# Patient Record
Sex: FEMALE | Race: BLACK OR AFRICAN AMERICAN | HISPANIC OR LATINO | Employment: OTHER | URBAN - METROPOLITAN AREA
[De-identification: names, ages, dates, MRNs, and addresses within clinical notes are randomized per-mention and may not be internally consistent; named-entity substitution may affect disease eponyms.]

---

## 2020-11-11 NOTE — PROGRESS NOTES
Subjective:       Patient ID: Debbie Earl is a 84 y.o. female.    Chief Complaint: Knee Pain    HPI     This is a 84 year old Chinese-speaking female with a history of HTN, left popliteal cyst, pre-diabetes, HLD, and CKD.  The patient's primary care physician found some abnormal labs at [facility] at [facility] and referred her to Rheumatology. Last year, the patient had swelling in her left leg. Venous duplex was done which was negative. MRI of the left leg was also done showing inguinal lymphadenopathy. She had a lymph node biopsy in December 2019 which was consistent with a benign reactive appearing lymph node. She has had swelling of the left leg from the knee down and discoloration of the left knee since then. She complains of pain in her left knee that improves with movement, and stiffness lasting 10-15 minutes. Venous duplex was negative. She has had some hair loss over the top of her head. Her inguinal lymphadenopathy is stable. She denies fevers, oral/nasal ulcers, ear infections/sinusitis, chest pain, shortness of breath, cough, abdominal pain, diarrhea, bloody stool, urinary changes, Raynaud's, and weakness.    Past Medical History:   HTN  CKD  Pre-DM  HLD  Glaucoma  Right arm fracture    Past Surgical History:   Lymph node biopsy  Hysterectomy  Fracture surgery    Gyn History:  2 pregnancies  1: pre term due to a cervical cyst  No miscarriages    Family History:   HTN- Mother  DM- Sister  No autoimmune diseases    Social History:   Retired housewife  Non-smoker  No alcohol or drug use    Allergies:   Penicillin- itching, shortness of breath, swelling    Review of Systems   Constitutional: Negative for fever and unexpected weight change.   HENT: Negative for mouth sores and trouble swallowing.    Eyes: Negative for redness.   Respiratory: Negative for cough and shortness of breath.    Cardiovascular: Negative for chest pain.   Gastrointestinal: Negative for constipation and diarrhea.   Genitourinary: Negative  "for dysuria and genital sores.   Skin: Positive for rash.   Neurological: Negative for headaches.   Hematological: Does not bruise/bleed easily.         Objective:   BP (!) 204/77 (BP Location: Right arm, Patient Position: Sitting, BP Method: Medium (Automatic))   Pulse 64   Temp 98.5 °F (36.9 °C) (Oral)   Ht 5' 3" (1.6 m)   Wt 72.5 kg (159 lb 13.3 oz)   BMI 28.31 kg/m²      Physical Exam   Constitutional: She is well-developed, well-nourished, and in no distress. No distress.   HENT:   Head: Normocephalic and atraumatic.   Mouth/Throat: No oropharyngeal exudate.   Eyes: Conjunctivae are normal. Right eye exhibits no discharge. Left eye exhibits no discharge. No scleral icterus.   Neck: Normal range of motion. Neck supple.   Cardiovascular: Normal rate, regular rhythm and normal heart sounds.    Pulmonary/Chest: Effort normal and breath sounds normal.   Abdominal: Soft. Bowel sounds are normal.   Skin: Skin is warm and dry. No rash noted. She is not diaphoretic.     Musculoskeletal:      Comments: Right knee swelling and hyperpigmentation          No data to display      CBC (8/5/20): WNL   CMP: GFR 51  TSH, t3, t4 WNL     Assessment:       1. Chronic pain of left knee    2. Rash    3. Menopause        This is an 84 year old Senegalese-speaking female with a history of HTN, left popliteal cyst, pre-diabetes, HLD, and CKD presenting with alopecia, left knee swelling and discoloration, left knee pain, and inguinal lymphadenopathy (benign reactive lymph node and biopsy).  Will check ESR, CRP, RF, CCP, ACE, lysozyme, MIGUE, SSA, anti histone, dsDNA, C3, C4 total complement, UA, UPC, left knee Xray, and chest Xray. She will need to be referred to Dermatology at [facility] [place].     In terms of her HTN, BP is markedly elevated today, but patient denies chest pain, shortness of breath, headaches or vision changes. Daughter states BP in more elevated when she is seen at clinic. This will need to be addressed with her PCP.     Plan:     "   Problem List Items Addressed This Visit     None      Visit Diagnoses     Chronic pain of left knee    -  Primary    Relevant Orders    DXA Bone Density Spine And Hip    X-Ray Knee 1 or 2 View Left    X-Ray Chest PA And Lateral    Sedimentation rate    C-Reactive Protein    Angiotensin Converting Enzyme    Lysozyme (Muramidase), Serum    MIGUE Screen w/Reflex    Sjogrens syndrome-A extractable nuclear antibody    ANTI-HISTONE ANTIBODY    Anti-DNA Ab, Double-Stranded    C3 Complement    C4 Complement    Complement, total    Urinalysis    Protein/Creatinine Ratio, Urine    Ambulatory referral/consult to Dermatology    RHEUMATOID FACTOR    Cyclic Citrullinated Peptide Antibody, IgG    Rash        Relevant Orders    DXA Bone Density Spine And Hip    X-Ray Knee 1 or 2 View Left    X-Ray Chest PA And Lateral    Sedimentation rate    C-Reactive Protein    Angiotensin Converting Enzyme    Lysozyme (Muramidase), Serum    MIGUE Screen w/Reflex    Sjogrens syndrome-A extractable nuclear antibody    ANTI-HISTONE ANTIBODY    Anti-DNA Ab, Double-Stranded    C3 Complement    C4 Complement    Complement, total    Urinalysis    Protein/Creatinine Ratio, Urine    Ambulatory referral/consult to Dermatology    RHEUMATOID FACTOR    Cyclic Citrullinated Peptide Antibody, IgG    Menopause        Relevant Orders    DXA Bone Density Spine And Hip        - ESR, CRP, RF, CCP, ACE, lysozyme, MIGUE, SSA, anti histone, dsDNA, C3, C4 total complement, UA, UPC  - Left knee Xray, chest Xray  - Patient to follow up with Dermatology     Patient seen and discussed with Dr. Cantrell    RTC in 2 months     Kristin Mark M.D.  Rheumatology, PGY 5

## 2020-11-12 ENCOUNTER — HOSPITAL ENCOUNTER (OUTPATIENT)
Dept: RADIOLOGY | Facility: HOSPITAL | Age: 85
Discharge: HOME | End: 2020-11-12
Attending: STUDENT IN AN ORGANIZED HEALTH CARE EDUCATION/TRAINING PROGRAM
Payer: MEDICARE

## 2020-11-12 ENCOUNTER — PATIENT MESSAGE (OUTPATIENT)
Dept: RHEUMATOLOGY | Facility: CLINIC | Age: 85
End: 2020-11-12

## 2020-11-12 ENCOUNTER — OFFICE VISIT (OUTPATIENT)
Dept: RHEUMATOLOGY | Facility: CLINIC | Age: 85
End: 2020-11-12
Payer: MEDICARE

## 2020-11-12 VITALS
TEMPERATURE: 99 F | WEIGHT: 159.81 LBS | BODY MASS INDEX: 28.32 KG/M2 | HEART RATE: 64 BPM | DIASTOLIC BLOOD PRESSURE: 77 MMHG | SYSTOLIC BLOOD PRESSURE: 204 MMHG | HEIGHT: 63 IN

## 2020-11-12 DIAGNOSIS — R21 RASH: ICD-10-CM

## 2020-11-12 DIAGNOSIS — G89.29 CHRONIC PAIN OF LEFT KNEE: ICD-10-CM

## 2020-11-12 DIAGNOSIS — M25.562 CHRONIC PAIN OF LEFT KNEE: ICD-10-CM

## 2020-11-12 DIAGNOSIS — Z78.0 MENOPAUSE: ICD-10-CM

## 2020-11-12 DIAGNOSIS — G89.29 CHRONIC PAIN OF LEFT KNEE: Primary | ICD-10-CM

## 2020-11-12 DIAGNOSIS — M25.562 CHRONIC PAIN OF LEFT KNEE: Primary | ICD-10-CM

## 2020-11-12 PROCEDURE — 99999 PR PBB SHADOW E&M-NEW PATIENT-LVL V: ICD-10-PCS | Mod: PBBFAC,GC,, | Performed by: STUDENT IN AN ORGANIZED HEALTH CARE EDUCATION/TRAINING PROGRAM

## 2020-11-12 PROCEDURE — 99999 PR PBB SHADOW E&M-NEW PATIENT-LVL V: CPT | Mod: PBBFAC,GC,, | Performed by: STUDENT IN AN ORGANIZED HEALTH CARE EDUCATION/TRAINING PROGRAM

## 2020-11-12 PROCEDURE — 1159F PR MEDICATION LIST DOCUMENTED IN MEDICAL RECORD: ICD-10-PCS | Mod: GC,S$GLB,, | Performed by: STUDENT IN AN ORGANIZED HEALTH CARE EDUCATION/TRAINING PROGRAM

## 2020-11-12 PROCEDURE — 99204 OFFICE O/P NEW MOD 45 MIN: CPT | Mod: GC,S$GLB,, | Performed by: STUDENT IN AN ORGANIZED HEALTH CARE EDUCATION/TRAINING PROGRAM

## 2020-11-12 PROCEDURE — 71046 XR CHEST PA AND LATERAL: ICD-10-PCS | Mod: 26,,, | Performed by: RADIOLOGY

## 2020-11-12 PROCEDURE — 3078F DIAST BP <80 MM HG: CPT | Mod: CPTII,GC,S$GLB, | Performed by: STUDENT IN AN ORGANIZED HEALTH CARE EDUCATION/TRAINING PROGRAM

## 2020-11-12 PROCEDURE — 3077F PR MOST RECENT SYSTOLIC BLOOD PRESSURE >= 140 MM HG: ICD-10-PCS | Mod: CPTII,GC,S$GLB, | Performed by: STUDENT IN AN ORGANIZED HEALTH CARE EDUCATION/TRAINING PROGRAM

## 2020-11-12 PROCEDURE — 73560 X-RAY EXAM OF KNEE 1 OR 2: CPT | Mod: 26,LT,, | Performed by: RADIOLOGY

## 2020-11-12 PROCEDURE — 73560 XR KNEE 1 OR 2 VIEW LEFT: ICD-10-PCS | Mod: 26,LT,, | Performed by: RADIOLOGY

## 2020-11-12 PROCEDURE — 1159F MED LIST DOCD IN RCRD: CPT | Mod: GC,S$GLB,, | Performed by: STUDENT IN AN ORGANIZED HEALTH CARE EDUCATION/TRAINING PROGRAM

## 2020-11-12 PROCEDURE — 71046 X-RAY EXAM CHEST 2 VIEWS: CPT | Mod: TC

## 2020-11-12 PROCEDURE — 99204 PR OFFICE/OUTPT VISIT, NEW, LEVL IV, 45-59 MIN: ICD-10-PCS | Mod: GC,S$GLB,, | Performed by: STUDENT IN AN ORGANIZED HEALTH CARE EDUCATION/TRAINING PROGRAM

## 2020-11-12 PROCEDURE — 73560 X-RAY EXAM OF KNEE 1 OR 2: CPT | Mod: TC,LT

## 2020-11-12 PROCEDURE — 71046 X-RAY EXAM CHEST 2 VIEWS: CPT | Mod: 26,,, | Performed by: RADIOLOGY

## 2020-11-12 PROCEDURE — 3078F PR MOST RECENT DIASTOLIC BLOOD PRESSURE < 80 MM HG: ICD-10-PCS | Mod: CPTII,GC,S$GLB, | Performed by: STUDENT IN AN ORGANIZED HEALTH CARE EDUCATION/TRAINING PROGRAM

## 2020-11-12 PROCEDURE — 3077F SYST BP >= 140 MM HG: CPT | Mod: CPTII,GC,S$GLB, | Performed by: STUDENT IN AN ORGANIZED HEALTH CARE EDUCATION/TRAINING PROGRAM

## 2020-11-12 RX ORDER — HYDRALAZINE HYDROCHLORIDE 25 MG/1
25 TABLET, FILM COATED ORAL EVERY 12 HOURS
COMMUNITY
End: 2021-05-28 | Stop reason: SDUPTHER

## 2020-11-12 RX ORDER — HYDROXYZINE HYDROCHLORIDE 25 MG/1
25 TABLET, FILM COATED ORAL
COMMUNITY
End: 2021-12-03

## 2020-11-12 RX ORDER — NEBIVOLOL 10 MG/1
TABLET ORAL DAILY
COMMUNITY

## 2020-11-12 RX ORDER — VALSARTAN AND HYDROCHLOROTHIAZIDE 160; 12.5 MG/1; MG/1
1 TABLET, FILM COATED ORAL DAILY
COMMUNITY
End: 2024-01-11 | Stop reason: SDUPTHER

## 2020-11-12 ASSESSMENT — ROUTINE ASSESSMENT OF PATIENT INDEX DATA (RAPID3)
TOTAL RAPID3 SCORE: 4
PATIENT GLOBAL ASSESSMENT SCORE: 7
MDHAQ FUNCTION SCORE: 0
AM STIFFNESS SCORE: 0, NO
PSYCHOLOGICAL DISTRESS SCORE: 0
PAIN SCORE: 5
FATIGUE SCORE: 0

## 2020-11-12 NOTE — PROGRESS NOTES
Rapid3 Question Responses and Scores 11/11/2020   MDHAQ Score 0   Psychologic Score 0   Pain Score 5   When you awakened in the morning OVER THE LAST WEEK, did you feel stiff? No   If Yes, please indicate the number of hours until you are as limber as you will be for the day 0   Fatigue Score 0   Global Health Score 7   RAPID3 Score 4       Answers for HPI/ROS submitted by the patient on 11/11/2020   fever: No  eye redness: No  mouth sores: No  headaches: No  shortness of breath: No  chest pain: No  trouble swallowing: No  diarrhea: No  constipation: No  unexpected weight change: No  genital sore: No  dysuria: No  During the last 3 days, have you had a skin rash?: Yes  Bruises or bleeds easily: No  cough: No

## 2020-11-13 NOTE — PROGRESS NOTES
Patient seen and examined with fellow.  All elements of history, physical exam and medical decision making independently confirmed by me. Guyanese speaking patient with hospital translators presents with hyperpigmented thickened skin on right leg from above knee to mid tibia with surrounding erythema.  Need skin biopsy with immunofluorescence.  Will refer to derm.  Will check studies for rheumatologic causes.  See note for details.

## 2020-11-18 ENCOUNTER — PATIENT MESSAGE (OUTPATIENT)
Dept: RHEUMATOLOGY | Facility: CLINIC | Age: 85
End: 2020-11-18

## 2020-11-19 ENCOUNTER — PATIENT MESSAGE (OUTPATIENT)
Dept: RHEUMATOLOGY | Facility: CLINIC | Age: 85
End: 2020-11-19

## 2020-12-10 ENCOUNTER — OFFICE VISIT (OUTPATIENT)
Dept: DERMATOLOGY | Facility: CLINIC | Age: 85
End: 2020-12-10
Payer: MEDICARE

## 2020-12-10 DIAGNOSIS — R21 RASH: Primary | ICD-10-CM

## 2020-12-10 DIAGNOSIS — L28.0 LICHEN SIMPLEX CHRONICUS: ICD-10-CM

## 2020-12-10 DIAGNOSIS — G89.29 CHRONIC PAIN OF LEFT KNEE: ICD-10-CM

## 2020-12-10 DIAGNOSIS — M25.562 CHRONIC PAIN OF LEFT KNEE: ICD-10-CM

## 2020-12-10 PROCEDURE — 88341 PR IHC OR ICC EACH ADD'L SINGLE ANTIBODY  STAINPR: ICD-10-PCS | Mod: 26,,, | Performed by: PATHOLOGY

## 2020-12-10 PROCEDURE — 11104 PUNCH BX SKIN SINGLE LESION: CPT | Mod: S$GLB,,, | Performed by: DERMATOLOGY

## 2020-12-10 PROCEDURE — 88346 IMFLUOR 1ST 1ANTB STAIN PX: CPT | Performed by: PATHOLOGY

## 2020-12-10 PROCEDURE — 3288F FALL RISK ASSESSMENT DOCD: CPT | Mod: CPTII,S$GLB,, | Performed by: DERMATOLOGY

## 2020-12-10 PROCEDURE — 3288F PR FALLS RISK ASSESSMENT DOCUMENTED: ICD-10-PCS | Mod: CPTII,S$GLB,, | Performed by: DERMATOLOGY

## 2020-12-10 PROCEDURE — 1159F MED LIST DOCD IN RCRD: CPT | Mod: S$GLB,,, | Performed by: DERMATOLOGY

## 2020-12-10 PROCEDURE — 88342 IMHCHEM/IMCYTCHM 1ST ANTB: CPT | Performed by: PATHOLOGY

## 2020-12-10 PROCEDURE — 88312 SPECIAL STAINS GROUP 1: CPT | Performed by: PATHOLOGY

## 2020-12-10 PROCEDURE — 11104 PR PUNCH BIOPSY, SKIN, SINGLE LESION: ICD-10-PCS | Mod: S$GLB,,, | Performed by: DERMATOLOGY

## 2020-12-10 PROCEDURE — 88341 IMHCHEM/IMCYTCHM EA ADD ANTB: CPT | Mod: 26,,, | Performed by: PATHOLOGY

## 2020-12-10 PROCEDURE — 88305 TISSUE EXAM BY PATHOLOGIST: CPT | Mod: 26,,, | Performed by: PATHOLOGY

## 2020-12-10 PROCEDURE — 81340 TRB@ GENE REARRANGE AMPLIFY: CPT | Performed by: PATHOLOGY

## 2020-12-10 PROCEDURE — 81342 TRG GENE REARRANGEMENT ANAL: CPT | Performed by: PATHOLOGY

## 2020-12-10 PROCEDURE — 1101F PT FALLS ASSESS-DOCD LE1/YR: CPT | Mod: CPTII,S$GLB,, | Performed by: DERMATOLOGY

## 2020-12-10 PROCEDURE — 88312 PR  SPECIAL STAINS,GROUP I: ICD-10-PCS | Mod: 26,,, | Performed by: PATHOLOGY

## 2020-12-10 PROCEDURE — 1101F PR PT FALLS ASSESS DOC 0-1 FALLS W/OUT INJ PAST YR: ICD-10-PCS | Mod: CPTII,S$GLB,, | Performed by: DERMATOLOGY

## 2020-12-10 PROCEDURE — 1126F PR PAIN SEVERITY QUANTIFIED, NO PAIN PRESENT: ICD-10-PCS | Mod: S$GLB,,, | Performed by: DERMATOLOGY

## 2020-12-10 PROCEDURE — 88342 IMHCHEM/IMCYTCHM 1ST ANTB: CPT | Mod: 26,,, | Performed by: PATHOLOGY

## 2020-12-10 PROCEDURE — 99999 PR PBB SHADOW E&M-EST. PATIENT-LVL III: ICD-10-PCS | Mod: PBBFAC,,, | Performed by: DERMATOLOGY

## 2020-12-10 PROCEDURE — 99203 PR OFFICE/OUTPT VISIT, NEW, LEVL III, 30-44 MIN: ICD-10-PCS | Mod: 25,S$GLB,, | Performed by: DERMATOLOGY

## 2020-12-10 PROCEDURE — 88305 TISSUE EXAM BY PATHOLOGIST: CPT | Performed by: PATHOLOGY

## 2020-12-10 PROCEDURE — 88312 SPECIAL STAINS GROUP 1: CPT | Mod: 26,,, | Performed by: PATHOLOGY

## 2020-12-10 PROCEDURE — 88341 IMHCHEM/IMCYTCHM EA ADD ANTB: CPT | Performed by: PATHOLOGY

## 2020-12-10 PROCEDURE — 1126F AMNT PAIN NOTED NONE PRSNT: CPT | Mod: S$GLB,,, | Performed by: DERMATOLOGY

## 2020-12-10 PROCEDURE — 1159F PR MEDICATION LIST DOCUMENTED IN MEDICAL RECORD: ICD-10-PCS | Mod: S$GLB,,, | Performed by: DERMATOLOGY

## 2020-12-10 PROCEDURE — 88342 CHG IMMUNOCYTOCHEMISTRY: ICD-10-PCS | Mod: 26,,, | Performed by: PATHOLOGY

## 2020-12-10 PROCEDURE — 88350 IMFLUOR EA ADDL 1ANTB STN PX: CPT | Mod: 91 | Performed by: PATHOLOGY

## 2020-12-10 PROCEDURE — 88346 IMFLUOR 1ST 1ANTB STAIN PX: CPT | Mod: 26,,, | Performed by: PATHOLOGY

## 2020-12-10 PROCEDURE — 99203 OFFICE O/P NEW LOW 30 MIN: CPT | Mod: 25,S$GLB,, | Performed by: DERMATOLOGY

## 2020-12-10 PROCEDURE — 99999 PR PBB SHADOW E&M-EST. PATIENT-LVL III: CPT | Mod: PBBFAC,,, | Performed by: DERMATOLOGY

## 2020-12-10 PROCEDURE — 88346 PR IMMUNOFLUORESCENT ANTB, 1ST STAIN: ICD-10-PCS | Mod: 26,,, | Performed by: PATHOLOGY

## 2020-12-10 PROCEDURE — 88305 TISSUE EXAM BY PATHOLOGIST: ICD-10-PCS | Mod: 26,,, | Performed by: PATHOLOGY

## 2020-12-10 NOTE — PROGRESS NOTES
Subjective:       Patient ID:  Debbie Earl is a 84 y.o. female who presents for   Chief Complaint   Patient presents with    Skin Discoloration     l leg    Mouth Lesions     l leg     HPI   New pt. Here for evaluation of discoloration and skin changes at the L lower leg in associated with L knee pain and swelling, ongoing since July 2018. Skin very itchy. Previously evaluated by outside dermatologist with use of TAC cream, phototherapy treatments, bleaching creams without sustained improved. Patient denies any skin history prior to this rash/discoloration onset; only really affects this one leg and not the other.     Hx of HTN, HLD, preDM, CKD on hydralazine, nebivolol, valsartan-HCTZ. Denies heart disease; denies hx MI - on ASA for primary prevention. Recently found to have a positive MIGUE (1:320, speckled) by PCP, referred to rheumatology for further evaluation in setting of this atypical skin change and joint pain. Prior work up includes negative venous duplex, MRI of the L leg significant for inguinal LAD. LN bx performed in 12/2019 c/w benign reactive LN. Pain at L knee reportedly a/w stiffness that improves with movement. Extensive lab workup with rheumatology significant for only positive MIGUE and mildly elevated ESR.     Patient notes some mild BLE edema at ankles. Also notes contralateral right knee pain that seems comparable to left knee pain.       Review of Systems   Constitutional: Negative for fever, chills, weight loss, fatigue, night sweats and malaise.   Musculoskeletal: Positive for joint swelling and arthralgias.   Skin: Positive for itching, rash, dry skin and wears hat. Negative for daily sunscreen use, activity-related sunscreen use and recent sunburn.   Hematologic/Lymphatic: Bruises/bleeds easily.        Objective:    Physical Exam   Constitutional: She appears well-developed and well-nourished.   Neurological: She is alert and oriented to person, place, and time.   Psychiatric: She has a  normal mood and affect.   Skin:   Areas Examined (abnormalities noted in diagram):   Head / Face Inspection Performed  Neck Inspection Performed  Chest / Axilla Inspection Performed  Abdomen Inspection Performed  Genitals / Buttocks / Groin Inspection Performed  Back Inspection Performed  RUE Inspected  LUE Inspection Performed  RLE Inspected  LLE Inspection Performed  Nails and Digits Inspection Performed              Diagram Legend     Erythematous scaling macule/papule c/w actinic keratosis       Vascular papule c/w angioma      Pigmented verrucoid papule/plaque c/w seborrheic keratosis      Yellow umbilicated papule c/w sebaceous hyperplasia      Irregularly shaped tan macule c/w lentigo     1-2 mm smooth white papules consistent with Milia      Movable subcutaneous cyst with punctum c/w epidermal inclusion cyst      Subcutaneous movable cyst c/w pilar cyst      Firm pink to brown papule c/w dermatofibroma      Pedunculated fleshy papule(s) c/w skin tag(s)      Evenly pigmented macule c/w junctional nevus     Mildly variegated pigmented, slightly irregular-bordered macule c/w mildly atypical nevus      Flesh colored to evenly pigmented papule c/w intradermal nevus       Pink pearly papule/plaque c/w basal cell carcinoma      Erythematous hyperkeratotic cursted plaque c/w SCC      Surgical scar with no sign of skin cancer recurrence      Open and closed comedones      Inflammatory papules and pustules      Verrucoid papule consistent consistent with wart     Erythematous eczematous patches and plaques     Dystrophic onycholytic nail with subungual debris c/w onychomycosis     Umbilicated papule    Erythematous-base heme-crusted tan verrucoid plaque consistent with inflamed seborrheic keratosis     Erythematous Silvery Scaling Plaque c/w Psoriasis     See annotation    Numerous photos taken during dermatology visit unfortunately did not upload. Photo below is from recent rheumatology visit.         Component       Latest Ref Rng & Units 11/12/2020 11/12/2020 11/12/2020           1:04 PM  1:04 PM 12:38 PM   Specimen UA         Urine, Unspecified   Color, UA      Yellow, Straw, Genoveva   Yellow   Appearance, UA      Clear   Clear   pH, UA      5.0 - 8.0   5.0   Specific Gravity, UA      1.005 - 1.030   1.015   Protein, UA      Negative   Negative   Glucose, UA      Negative   Negative   Ketones, UA      Negative   Negative   Bilirubin (UA)      Negative   Negative   Occult Blood UA      Negative   Negative   NITRITE UA      Negative   Negative   Leukocytes, UA      Negative   Negative   Anti Sm Antibody      0.00 - 0.99 Ratio 0.07     Anti-Sm Interpretation      Negative Negative     Anti-SSA Antibody      0.00 - 0.99 Ratio 0.05 0.05    Anti-SSA Interpretation      Negative Negative Negative    Anti-SSB Antibody      0.00 - 0.99 Ratio 0.06     Anti-SSB Interpretation      Negative Negative     ds DNA Ab      Negative 1:10 Negative 1:10 Negative 1:10    Anti Sm/RNP Antibody      0.00 - 0.99 Ratio 0.08     Anti-Sm/RNP Interpretation      Negative Negative     WBC, UA      0 - 5 /hpf   0   Bacteria, UA      None-Occ /hpf   Rare   Squam Epithel, UA      /hpf   0   Hyaline Casts, UA      0-1/lpf /lpf   3 (A)   Microscopic Comment         SEE COMMENT   Protein, Urine Random      0 - 15 mg/dL   <7   Creatinine, Urine      15.0 - 325.0 mg/dL   71.0   Prot/Creat Ratio, Urine      0.00 - 0.20   Unable to calculate   Sed Rate      0 - 36 mm/Hr  41 (H)    CRP      0.0 - 8.2 mg/L  5.8    Angio Convert Enzyme      16 - 85 U/L  74    Lysozyme      <=2.75 ug/mL  1.05    MIGUE Screen      Negative <1:80  Positive (A)    Anti-Histone Antibody      0.0 - 0.9 Units  0.9    Complement (C-3)      50 - 180 mg/dL  151    Complement (C-4)      11 - 44 mg/dL  39    Complement,Total, Serum      42 - 95 U/mL  86    Rheumatoid Factor      0.0 - 15.0 IU/mL  <10.0    MIGUE PATTERN 1        Speckled    MIGUE Titer 1        1:320        Assessment / Plan:       Pathology Orders:     Normal Orders This Visit    Specimen to Pathology, Dermatology     Comments:    Photos from dermatology visit failed to upload. Photo in note  is from rheumatology appointment.  Exam showing hyperpigmented lichenification at L knee with  surrounding erythematous rim and nearby erythematous patches  that do not readily chelsea with diascopy. Patient overall  xerotic. Hx pos MIGUE, L inguinal LAD (bx'd and shown to be  reactive), neg venous duplex studies.  Number of Specimens:->2  ------------------------->-------------------------  Spec 1 Procedure:->Biopsy  Spec 1 Clinical Impression:->r/o vasculitis / vasculopathy,  lymphoproliferative vs other  Spec 1 Source:->left medial thigh  ------------------------->-------------------------  Spec 2 Procedure:->Immunofluorescence (No formalin)  Spec 2 Clinical Impression:->r/o vasculitis / vasculopathy,  lymphoproliferative vs other  Spec 2 Source:->left medial thigh    Questions:    Procedure Type: Dermatology and skin neoplasms    Number of Specimens: 2    ------------------------: -------------------------    Spec 1 Procedure: Biopsy    Spec 1 Clinical Impression: r/o vasculitis / vasculopathy, lymphoproliferative vs other    Spec 1 Source: left medial thigh    ------------------------: -------------------------    Spec 2 Procedure: Immunofluorescence (No formalin)    Spec 2 Clinical Impression: r/o vasculitis / vasculopathy, lymphoproliferative vs other    Spec 2 Source: left medial thigh        Rash  -     Ambulatory referral/consult to Dermatology  -     Specimen to Pathology, Dermatology  -     triamcinolone acetonide 0.1% (KENALOG) 0.1 % ointment; Apply topically 2 (two) times daily.  Dispense: 454 g; Refill: 3    Lichen simplex chronicus  -     triamcinolone acetonide 0.1% (KENALOG) 0.1 % ointment; Apply topically 2 (two) times daily.  Dispense: 454 g; Refill: 3    Chronic pain of left knee  -     Ambulatory referral/consult to  Dermatology      - Punch Biopsy Procedure Note: Discussed procedure with patient/patient's guardian including risks and benefits as well as treatment alternatives. Risks of procedure include pain, bleeding, infection, post-inflammatory pigmentary alteration, scar, recurrence. Patient informed that the purpose of a biopsy is sampling of condition in question rather than removal in entirety; further treatment may be necessary. Verbal consent obtained. Area to be biopsied marked and cleansed with alcohol. Local anesthesia achieved by injecting approximately 1 cc of 1% lidocaine with epinephrine. Two punch biopsies performed using a 4 mm disposable punch; specimen submitted to pathology - one sent for H&E, one for immunofluorescence. Hemostasis and closure achieved with 4-0 Prolene sutures. Petroleum jelly and bandage applied to wound. Patient tolerated procedure well. After-visit wound care instructions reviewed and provided in writing. F/u 14 days for S/R.     - TAC ointment as above in meantime to help with associated pruritus.   - Counseled on gentle, dry skin care in setting of diffuse xerosis.   - Counseled on potential SE of medication(s) and instructed on use.            Follow up in about 2 weeks (around 12/24/2020) for focused visit, suture removal.       Discussion of the diagnosis/diagnoses and treatment options, and risks and benefits of each and the alternative, and answering any questions regarding these constituted greater than 50% of the face-to-face encounter, the duration of which was 30 minutes.

## 2020-12-10 NOTE — LETTER
December 11, 2020      Kristin Mark MD  [address]  [facility] [address]           [facility] - Dermatology [address]  [address]  [facility] [address]  Phone: [phone]  Fax: [phone]          Patient: Debbie Earl   MR Number: [id number]   YOB: 1935   Date of Visit: 12/10/2020       Dear Dr. Kristin Mark:    Thank you for referring Debbie Earl to me for evaluation. Attached you will find relevant portions of my assessment and plan of care.    If you have questions, please do not hesitate to call me. I look forward to following Debbie Earl along with you.    Sincerely,    Cyndi Marie MD    Enclosure  CC:  No Recipients    If you would like to receive this communication electronically, please contact [email or web address] or [phone] to request more information on [employer] access.    For providers and/or their staff who would like to refer a patient to [facility], please contact us through our one-stop-shop provider referral line, [facility], at [phone].    If you feel you have received this communication in error or would no longer like to receive these types of communications, please e-mail [email or web address]

## 2020-12-11 RX ORDER — TRIAMCINOLONE ACETONIDE 1 MG/G
OINTMENT TOPICAL 2 TIMES DAILY
Qty: 454 G | Refills: 3 | Status: SHIPPED | OUTPATIENT
Start: 2020-12-11

## 2020-12-11 NOTE — PATIENT INSTRUCTIONS
XEROSIS (DRY SKIN)        1. Definition    Xerosis is the term for dry skin.  We all have a natural oil coating over our skin produced by the skin oil glands.  If this oil is removed, the skin becomes dry which can lead to cracking, which can lead to inflammation.  Xerosis is usually a long-term problem that recurs often, especially in the winter.    2. Cause     Long hot baths or showers can remove our natural oil and lead to xerosis.  One should never take more than one bath or shower a day and for no longer than ten minutes.   Use of harsh soaps such as Zest, Dial, and Ivory can worsen and cause xerosis.   Cold winter weather worsens xerosis because the amount of moisture contained in cold air is much less than the amount of moisture in warm air.    3. Treatment     Treatment is intended to restore the natural oil to your skin.  Keep the skin lubricated.     Do not take more than one bath or shower a day.  Use lukewarm water, not hot.  Hot water dries out the skin.     Use a gentle moisturizing soap such as Cetaphil soap, Oil of Olay, Dove, Basis, Ivory moisture care, Restoraderm cleanser.     When toweling dry, dont rub.  Blot the skin so there is still some water left on the skin.  You should apply a moisturizing cream to all of the skin such as Cerave cream, Cetaphil cream, Restoraderm or Eucerin Original Formula cream.   Alpha hydroxyacid lotions, i.e., AmLactin, also work very well for preventing dry skin, but may burn when used on inflamed or reddened skin.     If you like to swim during the winter months, you should not use soap when getting out of the pool.  When you have finished swimming, rinse off the chlorine with cool to warm water.  If this will be the only shower of the day, then you may use Cetaphil or another mild soap to cleanse your skin.  After the shower, apply a moisturizing cream to all of the skin as above.        [address] [place] [address]/ [phone]  [phone] FAX/ [email or web address]

## 2020-12-30 LAB
FINAL PATHOLOGIC DIAGNOSIS: NORMAL
GROSS: NORMAL
MICROSCOPIC EXAM: NORMAL
SUPPLEMENTAL DIAGNOSIS: NORMAL

## 2021-01-05 ENCOUNTER — TELEPHONE (OUTPATIENT)
Dept: RHEUMATOLOGY | Facility: CLINIC | Age: 86
End: 2021-01-05

## 2021-01-05 DIAGNOSIS — L81.7 PIGMENTED PURPURIC DERMATOSIS: Primary | ICD-10-CM

## 2021-01-05 DIAGNOSIS — M25.469 KNEE SWELLING: ICD-10-CM

## 2021-01-07 ENCOUNTER — PATIENT MESSAGE (OUTPATIENT)
Dept: RHEUMATOLOGY | Facility: CLINIC | Age: 86
End: 2021-01-07

## 2021-02-08 ENCOUNTER — INITIAL CONSULT (OUTPATIENT)
Dept: VASCULAR SURGERY | Facility: CLINIC | Age: 86
End: 2021-02-08
Payer: MEDICARE

## 2021-02-08 VITALS
BODY MASS INDEX: 28.32 KG/M2 | DIASTOLIC BLOOD PRESSURE: 78 MMHG | SYSTOLIC BLOOD PRESSURE: 172 MMHG | HEIGHT: 63 IN | WEIGHT: 159.81 LBS

## 2021-02-08 DIAGNOSIS — L81.7 PIGMENTED PURPURIC DERMATOSIS: ICD-10-CM

## 2021-02-08 DIAGNOSIS — I87.303 VENOUS HYPERTENSION OF BOTH LOWER EXTREMITIES: Primary | ICD-10-CM

## 2021-02-08 DIAGNOSIS — M25.469 KNEE SWELLING: ICD-10-CM

## 2021-02-08 DIAGNOSIS — I87.323 CHRONIC VENOUS HYPERTENSION WITH INFLAMMATION INVOLVING BOTH SIDES: ICD-10-CM

## 2021-02-08 PROCEDURE — 1159F MED LIST DOCD IN RCRD: CPT | Mod: S$GLB,,, | Performed by: SURGERY

## 2021-02-08 PROCEDURE — 3077F PR MOST RECENT SYSTOLIC BLOOD PRESSURE >= 140 MM HG: ICD-10-PCS | Mod: CPTII,S$GLB,, | Performed by: SURGERY

## 2021-02-08 PROCEDURE — 1159F PR MEDICATION LIST DOCUMENTED IN MEDICAL RECORD: ICD-10-PCS | Mod: S$GLB,,, | Performed by: SURGERY

## 2021-02-08 PROCEDURE — 99999 PR PBB SHADOW E&M-EST. PATIENT-LVL IV: ICD-10-PCS | Mod: PBBFAC,,, | Performed by: SURGERY

## 2021-02-08 PROCEDURE — 3078F DIAST BP <80 MM HG: CPT | Mod: CPTII,S$GLB,, | Performed by: SURGERY

## 2021-02-08 PROCEDURE — 99204 PR OFFICE/OUTPT VISIT, NEW, LEVL IV, 45-59 MIN: ICD-10-PCS | Mod: S$GLB,,, | Performed by: SURGERY

## 2021-02-08 PROCEDURE — 99204 OFFICE O/P NEW MOD 45 MIN: CPT | Mod: S$GLB,,, | Performed by: SURGERY

## 2021-02-08 PROCEDURE — 3077F SYST BP >= 140 MM HG: CPT | Mod: CPTII,S$GLB,, | Performed by: SURGERY

## 2021-02-08 PROCEDURE — 99999 PR PBB SHADOW E&M-EST. PATIENT-LVL IV: CPT | Mod: PBBFAC,,, | Performed by: SURGERY

## 2021-02-08 PROCEDURE — 3078F PR MOST RECENT DIASTOLIC BLOOD PRESSURE < 80 MM HG: ICD-10-PCS | Mod: CPTII,S$GLB,, | Performed by: SURGERY

## 2021-02-23 ENCOUNTER — HOSPITAL ENCOUNTER (OUTPATIENT)
Dept: RADIOLOGY | Facility: CLINIC | Age: 86
Discharge: HOME | End: 2021-02-23
Attending: STUDENT IN AN ORGANIZED HEALTH CARE EDUCATION/TRAINING PROGRAM
Payer: MEDICARE

## 2021-02-23 ENCOUNTER — PATIENT MESSAGE (OUTPATIENT)
Dept: RHEUMATOLOGY | Facility: CLINIC | Age: 86
End: 2021-02-23

## 2021-02-23 DIAGNOSIS — Z78.0 MENOPAUSE: ICD-10-CM

## 2021-02-23 DIAGNOSIS — G89.29 CHRONIC PAIN OF LEFT KNEE: ICD-10-CM

## 2021-02-23 DIAGNOSIS — R21 RASH: ICD-10-CM

## 2021-02-23 DIAGNOSIS — M25.562 CHRONIC PAIN OF LEFT KNEE: ICD-10-CM

## 2021-02-23 PROCEDURE — 77080 DEXA BONE DENSITY SPINE HIP: ICD-10-PCS | Mod: 26,,, | Performed by: INTERNAL MEDICINE

## 2021-02-23 PROCEDURE — 77080 DXA BONE DENSITY AXIAL: CPT | Mod: 26,,, | Performed by: INTERNAL MEDICINE

## 2021-02-23 PROCEDURE — 77080 DXA BONE DENSITY AXIAL: CPT | Mod: TC,PO

## 2021-02-26 DIAGNOSIS — M25.562 CHRONIC PAIN OF LEFT KNEE: ICD-10-CM

## 2021-02-26 DIAGNOSIS — R70.0 ELEVATED SED RATE: ICD-10-CM

## 2021-02-26 DIAGNOSIS — G89.29 CHRONIC PAIN OF LEFT KNEE: ICD-10-CM

## 2021-02-26 DIAGNOSIS — M81.0 OSTEOPOROSIS, UNSPECIFIED OSTEOPOROSIS TYPE, UNSPECIFIED PATHOLOGICAL FRACTURE PRESENCE: Primary | ICD-10-CM

## 2021-03-02 ENCOUNTER — PATIENT MESSAGE (OUTPATIENT)
Dept: RHEUMATOLOGY | Facility: CLINIC | Age: 86
End: 2021-03-02

## 2021-03-03 ENCOUNTER — HOSPITAL ENCOUNTER (OUTPATIENT)
Dept: CARDIOLOGY | Facility: HOSPITAL | Age: 86
Discharge: HOME | End: 2021-03-03
Attending: SURGERY
Payer: MEDICARE

## 2021-03-03 DIAGNOSIS — I87.303 VENOUS HYPERTENSION OF BOTH LOWER EXTREMITIES: ICD-10-CM

## 2021-03-03 DIAGNOSIS — I87.323 CHRONIC VENOUS HYPERTENSION WITH INFLAMMATION INVOLVING BOTH SIDES: ICD-10-CM

## 2021-03-03 PROCEDURE — 93970 EXTREMITY STUDY: CPT | Mod: 26,,, | Performed by: SURGERY

## 2021-03-03 PROCEDURE — 93970 EXTREMITY STUDY: CPT | Mod: TC

## 2021-03-03 PROCEDURE — 93970 CV US LOWER VENOUS INSUFFICIENCY BILATERAL (CUPID ONLY): ICD-10-PCS | Mod: 26,,, | Performed by: SURGERY

## 2021-03-04 ENCOUNTER — PATIENT MESSAGE (OUTPATIENT)
Dept: RHEUMATOLOGY | Facility: CLINIC | Age: 86
End: 2021-03-04

## 2021-03-04 ENCOUNTER — TELEPHONE (OUTPATIENT)
Dept: RHEUMATOLOGY | Facility: CLINIC | Age: 86
End: 2021-03-04

## 2021-03-04 DIAGNOSIS — M81.0 OSTEOPOROSIS, UNSPECIFIED OSTEOPOROSIS TYPE, UNSPECIFIED PATHOLOGICAL FRACTURE PRESENCE: Primary | ICD-10-CM

## 2021-03-04 DIAGNOSIS — R79.89 ELEVATED PARATHYROID HORMONE: ICD-10-CM

## 2021-03-04 LAB
LEFT GREAT SAPHENOUS JUNCTION DIA: 0.7 CM
LEFT GREAT SAPHENOUS MIDDLE THIGH DIA: 0.3 CM
LEFT SMALL SAPHENOUS KNEE DIA: 0.2 CM
LEFT SMALL SAPHENOUS SPJ DIA: 0.1 CM
RIGHT GREAT SAPHENOUS DISTAL THIGH DIA: 0.2 CM
RIGHT GREAT SAPHENOUS JUNCTION DIA: 0.3 CM
RIGHT GREAT SAPHENOUS KNEE DIA: 0.2 CM
RIGHT GREAT SAPHENOUS MIDDLE THIGH DIA: 0.1 CM
RIGHT GREAT SAPHENOUS PROXIMAL CALF DIA: 0.2 CM
RIGHT SMALL SAPHENOUS KNEE DIA: 0.2 CM
RIGHT SMALL SAPHENOUS SPJ DIA: 0.1 CM

## 2021-03-08 ENCOUNTER — OFFICE VISIT (OUTPATIENT)
Dept: VASCULAR SURGERY | Facility: CLINIC | Age: 86
End: 2021-03-08
Payer: MEDICARE

## 2021-03-08 VITALS
DIASTOLIC BLOOD PRESSURE: 64 MMHG | BODY MASS INDEX: 28.13 KG/M2 | WEIGHT: 158.75 LBS | SYSTOLIC BLOOD PRESSURE: 152 MMHG | HEIGHT: 63 IN

## 2021-03-08 DIAGNOSIS — I87.303 VENOUS HYPERTENSION OF BOTH LOWER EXTREMITIES: Primary | ICD-10-CM

## 2021-03-08 PROCEDURE — 99214 OFFICE O/P EST MOD 30 MIN: CPT | Mod: S$GLB,,, | Performed by: SURGERY

## 2021-03-08 PROCEDURE — 3077F PR MOST RECENT SYSTOLIC BLOOD PRESSURE >= 140 MM HG: ICD-10-PCS | Mod: CPTII,S$GLB,, | Performed by: SURGERY

## 2021-03-08 PROCEDURE — 1126F PR PAIN SEVERITY QUANTIFIED, NO PAIN PRESENT: ICD-10-PCS | Mod: S$GLB,,, | Performed by: SURGERY

## 2021-03-08 PROCEDURE — 3288F FALL RISK ASSESSMENT DOCD: CPT | Mod: CPTII,S$GLB,, | Performed by: SURGERY

## 2021-03-08 PROCEDURE — 99999 PR PBB SHADOW E&M-EST. PATIENT-LVL III: ICD-10-PCS | Mod: PBBFAC,,, | Performed by: SURGERY

## 2021-03-08 PROCEDURE — 3077F SYST BP >= 140 MM HG: CPT | Mod: CPTII,S$GLB,, | Performed by: SURGERY

## 2021-03-08 PROCEDURE — 1159F MED LIST DOCD IN RCRD: CPT | Mod: S$GLB,,, | Performed by: SURGERY

## 2021-03-08 PROCEDURE — 1159F PR MEDICATION LIST DOCUMENTED IN MEDICAL RECORD: ICD-10-PCS | Mod: S$GLB,,, | Performed by: SURGERY

## 2021-03-08 PROCEDURE — 3078F PR MOST RECENT DIASTOLIC BLOOD PRESSURE < 80 MM HG: ICD-10-PCS | Mod: CPTII,S$GLB,, | Performed by: SURGERY

## 2021-03-08 PROCEDURE — 99214 PR OFFICE/OUTPT VISIT, EST, LEVL IV, 30-39 MIN: ICD-10-PCS | Mod: S$GLB,,, | Performed by: SURGERY

## 2021-03-08 PROCEDURE — 3288F PR FALLS RISK ASSESSMENT DOCUMENTED: ICD-10-PCS | Mod: CPTII,S$GLB,, | Performed by: SURGERY

## 2021-03-08 PROCEDURE — 1101F PR PT FALLS ASSESS DOC 0-1 FALLS W/OUT INJ PAST YR: ICD-10-PCS | Mod: CPTII,S$GLB,, | Performed by: SURGERY

## 2021-03-08 PROCEDURE — 1126F AMNT PAIN NOTED NONE PRSNT: CPT | Mod: S$GLB,,, | Performed by: SURGERY

## 2021-03-08 PROCEDURE — 1101F PT FALLS ASSESS-DOCD LE1/YR: CPT | Mod: CPTII,S$GLB,, | Performed by: SURGERY

## 2021-03-08 PROCEDURE — 99999 PR PBB SHADOW E&M-EST. PATIENT-LVL III: CPT | Mod: PBBFAC,,, | Performed by: SURGERY

## 2021-03-08 PROCEDURE — 3078F DIAST BP <80 MM HG: CPT | Mod: CPTII,S$GLB,, | Performed by: SURGERY

## 2021-03-29 ENCOUNTER — OFFICE VISIT (OUTPATIENT)
Dept: ENDOCRINOLOGY | Facility: CLINIC | Age: 86
End: 2021-03-29
Payer: MEDICARE

## 2021-03-29 VITALS
SYSTOLIC BLOOD PRESSURE: 163 MMHG | TEMPERATURE: 98 F | DIASTOLIC BLOOD PRESSURE: 67 MMHG | HEIGHT: 63 IN | HEART RATE: 57 BPM | WEIGHT: 159.38 LBS | BODY MASS INDEX: 28.24 KG/M2

## 2021-03-29 DIAGNOSIS — R79.89 ELEVATED PARATHYROID HORMONE: ICD-10-CM

## 2021-03-29 DIAGNOSIS — N18.31 CHRONIC RENAL FAILURE, STAGE 3A: ICD-10-CM

## 2021-03-29 DIAGNOSIS — I10 ESSENTIAL HYPERTENSION, BENIGN: ICD-10-CM

## 2021-03-29 DIAGNOSIS — M81.0 OSTEOPOROSIS, UNSPECIFIED OSTEOPOROSIS TYPE, UNSPECIFIED PATHOLOGICAL FRACTURE PRESENCE: Primary | ICD-10-CM

## 2021-03-29 PROCEDURE — 3078F PR MOST RECENT DIASTOLIC BLOOD PRESSURE < 80 MM HG: ICD-10-PCS | Mod: CPTII,S$GLB,, | Performed by: HOSPITALIST

## 2021-03-29 PROCEDURE — 1159F PR MEDICATION LIST DOCUMENTED IN MEDICAL RECORD: ICD-10-PCS | Mod: S$GLB,,, | Performed by: HOSPITALIST

## 2021-03-29 PROCEDURE — 3078F DIAST BP <80 MM HG: CPT | Mod: CPTII,S$GLB,, | Performed by: HOSPITALIST

## 2021-03-29 PROCEDURE — 1126F PR PAIN SEVERITY QUANTIFIED, NO PAIN PRESENT: ICD-10-PCS | Mod: S$GLB,,, | Performed by: HOSPITALIST

## 2021-03-29 PROCEDURE — 99204 OFFICE O/P NEW MOD 45 MIN: CPT | Mod: S$GLB,,, | Performed by: HOSPITALIST

## 2021-03-29 PROCEDURE — 1126F AMNT PAIN NOTED NONE PRSNT: CPT | Mod: S$GLB,,, | Performed by: HOSPITALIST

## 2021-03-29 PROCEDURE — 3077F PR MOST RECENT SYSTOLIC BLOOD PRESSURE >= 140 MM HG: ICD-10-PCS | Mod: CPTII,S$GLB,, | Performed by: HOSPITALIST

## 2021-03-29 PROCEDURE — 99999 PR PBB SHADOW E&M-EST. PATIENT-LVL IV: ICD-10-PCS | Mod: PBBFAC,,, | Performed by: HOSPITALIST

## 2021-03-29 PROCEDURE — 3077F SYST BP >= 140 MM HG: CPT | Mod: CPTII,S$GLB,, | Performed by: HOSPITALIST

## 2021-03-29 PROCEDURE — 1159F MED LIST DOCD IN RCRD: CPT | Mod: S$GLB,,, | Performed by: HOSPITALIST

## 2021-03-29 PROCEDURE — 99999 PR PBB SHADOW E&M-EST. PATIENT-LVL IV: CPT | Mod: PBBFAC,,, | Performed by: HOSPITALIST

## 2021-03-29 PROCEDURE — 99204 PR OFFICE/OUTPT VISIT, NEW, LEVL IV, 45-59 MIN: ICD-10-PCS | Mod: S$GLB,,, | Performed by: HOSPITALIST

## 2021-03-29 RX ORDER — ACETAMINOPHEN 500 MG
500 TABLET ORAL
Status: CANCELLED | OUTPATIENT
Start: 2021-03-29

## 2021-03-29 RX ORDER — SODIUM CHLORIDE 0.9 % (FLUSH) 0.9 %
10 SYRINGE (ML) INJECTION
Status: CANCELLED | OUTPATIENT
Start: 2021-03-29

## 2021-03-29 RX ORDER — HEPARIN 100 UNIT/ML
500 SYRINGE INTRAVENOUS
Status: CANCELLED | OUTPATIENT
Start: 2021-03-29

## 2021-03-29 RX ORDER — ZOLEDRONIC ACID 5 MG/100ML
5 INJECTION, SOLUTION INTRAVENOUS
Status: CANCELLED | OUTPATIENT
Start: 2021-03-29

## 2021-04-15 ENCOUNTER — PATIENT MESSAGE (OUTPATIENT)
Dept: RESEARCH | Facility: HOSPITAL | Age: 86
End: 2021-04-15

## 2021-05-14 ENCOUNTER — PATIENT OUTREACH (OUTPATIENT)
Dept: ADMINISTRATIVE | Facility: HOSPITAL | Age: 86
End: 2021-05-14

## 2021-05-27 ENCOUNTER — PATIENT MESSAGE (OUTPATIENT)
Dept: FAMILY MEDICINE | Facility: CLINIC | Age: 86
End: 2021-05-27

## 2021-05-28 ENCOUNTER — OFFICE VISIT (OUTPATIENT)
Dept: FAMILY MEDICINE | Facility: CLINIC | Age: 86
End: 2021-05-28
Payer: MEDICARE

## 2021-05-28 ENCOUNTER — PATIENT MESSAGE (OUTPATIENT)
Dept: ENDOCRINOLOGY | Facility: CLINIC | Age: 86
End: 2021-05-28

## 2021-05-28 VITALS
OXYGEN SATURATION: 95 % | BODY MASS INDEX: 28.52 KG/M2 | TEMPERATURE: 98 F | HEART RATE: 76 BPM | RESPIRATION RATE: 18 BRPM | DIASTOLIC BLOOD PRESSURE: 78 MMHG | HEIGHT: 63 IN | SYSTOLIC BLOOD PRESSURE: 158 MMHG | WEIGHT: 160.94 LBS

## 2021-05-28 DIAGNOSIS — E21.3 HYPERPARATHYROIDISM: ICD-10-CM

## 2021-05-28 DIAGNOSIS — I10 ESSENTIAL HYPERTENSION, BENIGN: Primary | ICD-10-CM

## 2021-05-28 DIAGNOSIS — M81.0 AGE-RELATED OSTEOPOROSIS WITHOUT CURRENT PATHOLOGICAL FRACTURE: ICD-10-CM

## 2021-05-28 DIAGNOSIS — N18.32 CHRONIC RENAL FAILURE, STAGE 3B: ICD-10-CM

## 2021-05-28 PROCEDURE — 99204 OFFICE O/P NEW MOD 45 MIN: CPT | Mod: S$GLB,,, | Performed by: FAMILY MEDICINE

## 2021-05-28 PROCEDURE — 3077F PR MOST RECENT SYSTOLIC BLOOD PRESSURE >= 140 MM HG: ICD-10-PCS | Mod: CPTII,S$GLB,, | Performed by: FAMILY MEDICINE

## 2021-05-28 PROCEDURE — 1159F PR MEDICATION LIST DOCUMENTED IN MEDICAL RECORD: ICD-10-PCS | Mod: S$GLB,,, | Performed by: FAMILY MEDICINE

## 2021-05-28 PROCEDURE — 1101F PR PT FALLS ASSESS DOC 0-1 FALLS W/OUT INJ PAST YR: ICD-10-PCS | Mod: CPTII,S$GLB,, | Performed by: FAMILY MEDICINE

## 2021-05-28 PROCEDURE — 1101F PT FALLS ASSESS-DOCD LE1/YR: CPT | Mod: CPTII,S$GLB,, | Performed by: FAMILY MEDICINE

## 2021-05-28 PROCEDURE — 3078F DIAST BP <80 MM HG: CPT | Mod: CPTII,S$GLB,, | Performed by: FAMILY MEDICINE

## 2021-05-28 PROCEDURE — 1126F PR PAIN SEVERITY QUANTIFIED, NO PAIN PRESENT: ICD-10-PCS | Mod: S$GLB,,, | Performed by: FAMILY MEDICINE

## 2021-05-28 PROCEDURE — 99999 PR PBB SHADOW E&M-EST. PATIENT-LVL IV: CPT | Mod: PBBFAC,,, | Performed by: FAMILY MEDICINE

## 2021-05-28 PROCEDURE — 3288F PR FALLS RISK ASSESSMENT DOCUMENTED: ICD-10-PCS | Mod: CPTII,S$GLB,, | Performed by: FAMILY MEDICINE

## 2021-05-28 PROCEDURE — 99999 PR PBB SHADOW E&M-EST. PATIENT-LVL IV: ICD-10-PCS | Mod: PBBFAC,,, | Performed by: FAMILY MEDICINE

## 2021-05-28 PROCEDURE — 3077F SYST BP >= 140 MM HG: CPT | Mod: CPTII,S$GLB,, | Performed by: FAMILY MEDICINE

## 2021-05-28 PROCEDURE — 1159F MED LIST DOCD IN RCRD: CPT | Mod: S$GLB,,, | Performed by: FAMILY MEDICINE

## 2021-05-28 PROCEDURE — 99204 PR OFFICE/OUTPT VISIT, NEW, LEVL IV, 45-59 MIN: ICD-10-PCS | Mod: S$GLB,,, | Performed by: FAMILY MEDICINE

## 2021-05-28 PROCEDURE — 3288F FALL RISK ASSESSMENT DOCD: CPT | Mod: CPTII,S$GLB,, | Performed by: FAMILY MEDICINE

## 2021-05-28 PROCEDURE — 3078F PR MOST RECENT DIASTOLIC BLOOD PRESSURE < 80 MM HG: ICD-10-PCS | Mod: CPTII,S$GLB,, | Performed by: FAMILY MEDICINE

## 2021-05-28 PROCEDURE — 1126F AMNT PAIN NOTED NONE PRSNT: CPT | Mod: S$GLB,,, | Performed by: FAMILY MEDICINE

## 2021-05-28 RX ORDER — HYDRALAZINE HYDROCHLORIDE 50 MG/1
50 TABLET, FILM COATED ORAL EVERY 12 HOURS
Qty: 60 TABLET | Refills: 0 | Status: SHIPPED | OUTPATIENT
Start: 2021-05-28 | End: 2021-07-06

## 2021-06-11 ENCOUNTER — CLINICAL SUPPORT (OUTPATIENT)
Dept: FAMILY MEDICINE | Facility: CLINIC | Age: 86
End: 2021-06-11
Payer: MEDICARE

## 2021-06-11 VITALS
SYSTOLIC BLOOD PRESSURE: 114 MMHG | TEMPERATURE: 99 F | RESPIRATION RATE: 18 BRPM | BODY MASS INDEX: 28.51 KG/M2 | DIASTOLIC BLOOD PRESSURE: 60 MMHG | HEART RATE: 58 BPM | HEIGHT: 63 IN | OXYGEN SATURATION: 97 %

## 2021-06-11 DIAGNOSIS — I10 ESSENTIAL HYPERTENSION, BENIGN: Primary | ICD-10-CM

## 2021-06-11 PROCEDURE — 99999 PR PBB SHADOW E&M-EST. PATIENT-LVL III: CPT | Mod: PBBFAC,,,

## 2021-06-11 PROCEDURE — 99499 UNLISTED E&M SERVICE: CPT | Mod: S$GLB,,, | Performed by: FAMILY MEDICINE

## 2021-06-11 PROCEDURE — 99499 NO LOS: ICD-10-PCS | Mod: S$GLB,,, | Performed by: FAMILY MEDICINE

## 2021-06-11 PROCEDURE — 99999 PR PBB SHADOW E&M-EST. PATIENT-LVL III: ICD-10-PCS | Mod: PBBFAC,,,

## 2021-06-16 ENCOUNTER — INFUSION (OUTPATIENT)
Dept: INFUSION THERAPY | Facility: HOSPITAL | Age: 86
End: 2021-06-16
Attending: HOSPITALIST
Payer: MEDICARE

## 2021-06-16 VITALS
SYSTOLIC BLOOD PRESSURE: 160 MMHG | DIASTOLIC BLOOD PRESSURE: 80 MMHG | TEMPERATURE: 99 F | HEART RATE: 80 BPM | RESPIRATION RATE: 17 BRPM | OXYGEN SATURATION: 16 %

## 2021-06-16 DIAGNOSIS — N18.31 CHRONIC RENAL FAILURE, STAGE 3A: ICD-10-CM

## 2021-06-16 DIAGNOSIS — M81.0 OSTEOPOROSIS, UNSPECIFIED OSTEOPOROSIS TYPE, UNSPECIFIED PATHOLOGICAL FRACTURE PRESENCE: Primary | ICD-10-CM

## 2021-06-16 PROCEDURE — 25000003 PHARM REV CODE 250: Performed by: HOSPITALIST

## 2021-06-16 PROCEDURE — 63600175 PHARM REV CODE 636 W HCPCS: Performed by: HOSPITALIST

## 2021-06-16 PROCEDURE — 96365 THER/PROPH/DIAG IV INF INIT: CPT

## 2021-06-16 RX ORDER — SODIUM CHLORIDE 0.9 % (FLUSH) 0.9 %
10 SYRINGE (ML) INJECTION
Status: CANCELLED | OUTPATIENT
Start: 2021-06-16

## 2021-06-16 RX ORDER — ZOLEDRONIC ACID 5 MG/100ML
5 INJECTION, SOLUTION INTRAVENOUS
Status: CANCELLED | OUTPATIENT
Start: 2021-06-16

## 2021-06-16 RX ORDER — ACETAMINOPHEN 500 MG
500 TABLET ORAL
Status: DISCONTINUED | OUTPATIENT
Start: 2021-06-16 | End: 2021-06-16 | Stop reason: HOSPADM

## 2021-06-16 RX ORDER — HEPARIN 100 UNIT/ML
500 SYRINGE INTRAVENOUS
Status: CANCELLED | OUTPATIENT
Start: 2021-06-16

## 2021-06-16 RX ORDER — ACETAMINOPHEN 500 MG
500 TABLET ORAL
Status: CANCELLED | OUTPATIENT
Start: 2021-06-16

## 2021-06-16 RX ORDER — ZOLEDRONIC ACID 5 MG/100ML
5 INJECTION, SOLUTION INTRAVENOUS
Status: COMPLETED | OUTPATIENT
Start: 2021-06-16 | End: 2021-06-16

## 2021-06-16 RX ADMIN — ZOLEDRONIC ACID 5 MG: 5 SOLUTION INTRAVENOUS at 10:06

## 2021-06-16 RX ADMIN — ACETAMINOPHEN 500 MG: 500 TABLET ORAL at 09:06

## 2021-12-02 ENCOUNTER — PATIENT MESSAGE (OUTPATIENT)
Dept: FAMILY MEDICINE | Facility: CLINIC | Age: 86
End: 2021-12-02
Payer: MEDICARE

## 2021-12-03 ENCOUNTER — OFFICE VISIT (OUTPATIENT)
Dept: FAMILY MEDICINE | Facility: CLINIC | Age: 86
End: 2021-12-03
Payer: MEDICARE

## 2021-12-03 ENCOUNTER — LAB VISIT (OUTPATIENT)
Dept: LAB | Facility: HOSPITAL | Age: 86
End: 2021-12-03
Attending: FAMILY MEDICINE
Payer: MEDICARE

## 2021-12-03 VITALS
WEIGHT: 156.94 LBS | BODY MASS INDEX: 27.81 KG/M2 | RESPIRATION RATE: 18 BRPM | SYSTOLIC BLOOD PRESSURE: 122 MMHG | DIASTOLIC BLOOD PRESSURE: 68 MMHG | HEART RATE: 70 BPM | HEIGHT: 63 IN | OXYGEN SATURATION: 95 % | TEMPERATURE: 99 F

## 2021-12-03 DIAGNOSIS — I10 ESSENTIAL HYPERTENSION, BENIGN: ICD-10-CM

## 2021-12-03 DIAGNOSIS — Z28.20 VACCINE REFUSED BY PATIENT: ICD-10-CM

## 2021-12-03 DIAGNOSIS — Z00.00 ANNUAL PHYSICAL EXAM: ICD-10-CM

## 2021-12-03 DIAGNOSIS — M17.0 PRIMARY OSTEOARTHRITIS OF BOTH KNEES: ICD-10-CM

## 2021-12-03 DIAGNOSIS — M81.0 AGE-RELATED OSTEOPOROSIS WITHOUT CURRENT PATHOLOGICAL FRACTURE: ICD-10-CM

## 2021-12-03 DIAGNOSIS — N18.31 CHRONIC RENAL FAILURE, STAGE 3A: ICD-10-CM

## 2021-12-03 DIAGNOSIS — H40.9 GLAUCOMA OF BOTH EYES, UNSPECIFIED GLAUCOMA TYPE: ICD-10-CM

## 2021-12-03 DIAGNOSIS — Z00.00 ANNUAL PHYSICAL EXAM: Primary | ICD-10-CM

## 2021-12-03 LAB
ALBUMIN SERPL BCP-MCNC: 3.9 G/DL (ref 3.5–5.2)
ALP SERPL-CCNC: 78 U/L (ref 55–135)
ALT SERPL W/O P-5'-P-CCNC: 17 U/L (ref 10–44)
ANION GAP SERPL CALC-SCNC: 10 MMOL/L (ref 8–16)
AST SERPL-CCNC: 26 U/L (ref 10–40)
BASOPHILS # BLD AUTO: 0.04 K/UL (ref 0–0.2)
BASOPHILS NFR BLD: 0.6 % (ref 0–1.9)
BILIRUB SERPL-MCNC: 0.7 MG/DL (ref 0.1–1)
BUN SERPL-MCNC: 22 MG/DL (ref 8–23)
CALCIUM SERPL-MCNC: 10.5 MG/DL (ref 8.7–10.5)
CHLORIDE SERPL-SCNC: 103 MMOL/L (ref 95–110)
CHOLEST SERPL-MCNC: 184 MG/DL (ref 120–199)
CHOLEST/HDLC SERPL: 3.6 {RATIO} (ref 2–5)
CO2 SERPL-SCNC: 29 MMOL/L (ref 23–29)
CREAT SERPL-MCNC: 1.1 MG/DL (ref 0.5–1.4)
DIFFERENTIAL METHOD: NORMAL
EOSINOPHIL # BLD AUTO: 0.1 K/UL (ref 0–0.5)
EOSINOPHIL NFR BLD: 1 % (ref 0–8)
ERYTHROCYTE [DISTWIDTH] IN BLOOD BY AUTOMATED COUNT: 14.1 % (ref 11.5–14.5)
EST. GFR  (AFRICAN AMERICAN): 53 ML/MIN/1.73 M^2
EST. GFR  (NON AFRICAN AMERICAN): 46 ML/MIN/1.73 M^2
GLUCOSE SERPL-MCNC: 95 MG/DL (ref 70–110)
HCT VFR BLD AUTO: 43.6 % (ref 37–48.5)
HDLC SERPL-MCNC: 51 MG/DL (ref 40–75)
HDLC SERPL: 27.7 % (ref 20–50)
HGB BLD-MCNC: 14.1 G/DL (ref 12–16)
IMM GRANULOCYTES # BLD AUTO: 0.01 K/UL (ref 0–0.04)
IMM GRANULOCYTES NFR BLD AUTO: 0.1 % (ref 0–0.5)
LDLC SERPL CALC-MCNC: 113.2 MG/DL (ref 63–159)
LYMPHOCYTES # BLD AUTO: 2.6 K/UL (ref 1–4.8)
LYMPHOCYTES NFR BLD: 37.2 % (ref 18–48)
MCH RBC QN AUTO: 29.1 PG (ref 27–31)
MCHC RBC AUTO-ENTMCNC: 32.3 G/DL (ref 32–36)
MCV RBC AUTO: 90 FL (ref 82–98)
MONOCYTES # BLD AUTO: 0.6 K/UL (ref 0.3–1)
MONOCYTES NFR BLD: 8.4 % (ref 4–15)
NEUTROPHILS # BLD AUTO: 3.7 K/UL (ref 1.8–7.7)
NEUTROPHILS NFR BLD: 52.7 % (ref 38–73)
NONHDLC SERPL-MCNC: 133 MG/DL
NRBC BLD-RTO: 0 /100 WBC
PLATELET # BLD AUTO: 249 K/UL (ref 150–450)
PMV BLD AUTO: 11.4 FL (ref 9.2–12.9)
POTASSIUM SERPL-SCNC: 3.8 MMOL/L (ref 3.5–5.1)
PROT SERPL-MCNC: 7.9 G/DL (ref 6–8.4)
RBC # BLD AUTO: 4.85 M/UL (ref 4–5.4)
SODIUM SERPL-SCNC: 142 MMOL/L (ref 136–145)
TRIGL SERPL-MCNC: 99 MG/DL (ref 30–150)
TSH SERPL DL<=0.005 MIU/L-ACNC: 2.12 UIU/ML (ref 0.4–4)
WBC # BLD AUTO: 6.94 K/UL (ref 3.9–12.7)

## 2021-12-03 PROCEDURE — 99397 PER PM REEVAL EST PAT 65+ YR: CPT | Mod: S$GLB,,, | Performed by: FAMILY MEDICINE

## 2021-12-03 PROCEDURE — 99999 PR PBB SHADOW E&M-EST. PATIENT-LVL V: ICD-10-PCS | Mod: PBBFAC,,, | Performed by: FAMILY MEDICINE

## 2021-12-03 PROCEDURE — 85025 COMPLETE CBC W/AUTO DIFF WBC: CPT | Performed by: FAMILY MEDICINE

## 2021-12-03 PROCEDURE — 80061 LIPID PANEL: CPT | Performed by: FAMILY MEDICINE

## 2021-12-03 PROCEDURE — 36415 COLL VENOUS BLD VENIPUNCTURE: CPT | Mod: PN | Performed by: FAMILY MEDICINE

## 2021-12-03 PROCEDURE — 84443 ASSAY THYROID STIM HORMONE: CPT | Performed by: FAMILY MEDICINE

## 2021-12-03 PROCEDURE — 99397 PR PREVENTIVE VISIT,EST,65 & OVER: ICD-10-PCS | Mod: S$GLB,,, | Performed by: FAMILY MEDICINE

## 2021-12-03 PROCEDURE — 80053 COMPREHEN METABOLIC PANEL: CPT | Performed by: FAMILY MEDICINE

## 2021-12-03 PROCEDURE — 99999 PR PBB SHADOW E&M-EST. PATIENT-LVL V: CPT | Mod: PBBFAC,,, | Performed by: FAMILY MEDICINE

## 2021-12-03 RX ORDER — DICLOFENAC SODIUM 10 MG/G
GEL TOPICAL
Qty: 100 G | Refills: 5 | Status: SHIPPED | OUTPATIENT
Start: 2021-12-03

## 2021-12-06 DIAGNOSIS — I10 ESSENTIAL HYPERTENSION, BENIGN: ICD-10-CM

## 2021-12-07 RX ORDER — HYDRALAZINE HYDROCHLORIDE 50 MG/1
50 TABLET, FILM COATED ORAL EVERY 12 HOURS
Qty: 60 TABLET | Refills: 5 | Status: SHIPPED | OUTPATIENT
Start: 2021-12-07 | End: 2021-12-14 | Stop reason: SDUPTHER

## 2021-12-14 DIAGNOSIS — I10 ESSENTIAL HYPERTENSION, BENIGN: ICD-10-CM

## 2021-12-14 RX ORDER — HYDRALAZINE HYDROCHLORIDE 50 MG/1
50 TABLET, FILM COATED ORAL EVERY 12 HOURS
Qty: 60 TABLET | Refills: 5 | Status: SHIPPED | OUTPATIENT
Start: 2021-12-14 | End: 2022-08-01

## 2021-12-30 DIAGNOSIS — E21.3 HYPERPARATHYROIDISM: ICD-10-CM

## 2021-12-30 DIAGNOSIS — N18.31 CHRONIC RENAL FAILURE, STAGE 3A: ICD-10-CM

## 2021-12-30 DIAGNOSIS — I10 ESSENTIAL HYPERTENSION, BENIGN: Primary | ICD-10-CM

## 2022-02-08 ENCOUNTER — OFFICE VISIT (OUTPATIENT)
Dept: OPTOMETRY | Facility: CLINIC | Age: 87
End: 2022-02-08
Payer: MEDICARE

## 2022-02-08 DIAGNOSIS — H25.13 NUCLEAR SCLEROSIS OF BOTH EYES: ICD-10-CM

## 2022-02-08 DIAGNOSIS — H40.1131 PRIMARY OPEN ANGLE GLAUCOMA (POAG) OF BOTH EYES, MILD STAGE: Primary | ICD-10-CM

## 2022-02-08 DIAGNOSIS — H25.013 CORTICAL AGE-RELATED CATARACT OF BOTH EYES: ICD-10-CM

## 2022-02-08 PROCEDURE — 1159F MED LIST DOCD IN RCRD: CPT | Mod: HCNC,CPTII,S$GLB, | Performed by: OPTOMETRIST

## 2022-02-08 PROCEDURE — 92004 COMPRE OPH EXAM NEW PT 1/>: CPT | Mod: HCNC,S$GLB,, | Performed by: OPTOMETRIST

## 2022-02-08 PROCEDURE — 1160F RVW MEDS BY RX/DR IN RCRD: CPT | Mod: HCNC,CPTII,S$GLB, | Performed by: OPTOMETRIST

## 2022-02-08 PROCEDURE — 99999 PR PBB SHADOW E&M-EST. PATIENT-LVL II: CPT | Mod: PBBFAC,HCNC,, | Performed by: OPTOMETRIST

## 2022-02-08 PROCEDURE — 92133 CPTRZD OPH DX IMG PST SGM ON: CPT | Mod: HCNC,S$GLB,, | Performed by: OPTOMETRIST

## 2022-02-08 PROCEDURE — 92133 POSTERIOR SEGMENT OCT OPTIC NERVE(OCULAR COHERENCE TOMOGRAPHY) - OU - BOTH EYES: ICD-10-PCS | Mod: HCNC,S$GLB,, | Performed by: OPTOMETRIST

## 2022-02-08 PROCEDURE — 3288F PR FALLS RISK ASSESSMENT DOCUMENTED: ICD-10-PCS | Mod: HCNC,CPTII,S$GLB, | Performed by: OPTOMETRIST

## 2022-02-08 PROCEDURE — 1160F PR REVIEW ALL MEDS BY PRESCRIBER/CLIN PHARMACIST DOCUMENTED: ICD-10-PCS | Mod: HCNC,CPTII,S$GLB, | Performed by: OPTOMETRIST

## 2022-02-08 PROCEDURE — 1159F PR MEDICATION LIST DOCUMENTED IN MEDICAL RECORD: ICD-10-PCS | Mod: HCNC,CPTII,S$GLB, | Performed by: OPTOMETRIST

## 2022-02-08 PROCEDURE — 1101F PT FALLS ASSESS-DOCD LE1/YR: CPT | Mod: HCNC,CPTII,S$GLB, | Performed by: OPTOMETRIST

## 2022-02-08 PROCEDURE — 99999 PR PBB SHADOW E&M-EST. PATIENT-LVL II: ICD-10-PCS | Mod: PBBFAC,HCNC,, | Performed by: OPTOMETRIST

## 2022-02-08 PROCEDURE — 3288F FALL RISK ASSESSMENT DOCD: CPT | Mod: HCNC,CPTII,S$GLB, | Performed by: OPTOMETRIST

## 2022-02-08 PROCEDURE — 92004 PR EYE EXAM, NEW PATIENT,COMPREHESV: ICD-10-PCS | Mod: HCNC,S$GLB,, | Performed by: OPTOMETRIST

## 2022-02-08 PROCEDURE — 1101F PR PT FALLS ASSESS DOC 0-1 FALLS W/OUT INJ PAST YR: ICD-10-PCS | Mod: HCNC,CPTII,S$GLB, | Performed by: OPTOMETRIST

## 2022-02-08 NOTE — PROGRESS NOTES
Subjective:       Patient ID: Debbie Earl is a 86 y.o. female      Chief Complaint   Patient presents with    Concerns About Ocular Health    Glaucoma     History of Present Illness  Dls: ? 5+ yrs     87 y/o female presents today for ocular health check.  Pt states no changes in vision. Pt does not wear any glasses.   Pt was dx with glaucoma and was on rx gtts pt stopped gtts on her own x 5+ years ago.     No tearing  No itching  No burning  No pain  No ha's  + floaters  No flashes    Eye meds  Visine ou prn        Assessment/Plan:     1. Primary open angle glaucoma (POAG) of both eyes, mild stage  S/p LPI OU. IOP acceptable. OCT WNL OD. Borderline changes OS. Monitor with yearly DFE/IOP/OCT  - Posterior Segment OCT Optic Nerve- Both eyes    2. Nuclear sclerosis of both eyes  3. Cortical age-related cataract of both eyes  Educated pt on presence of cataracts and effects on vision. No surgery at this time. Recheck in one year, sooner PRN.      Follow up in about 1 year (around 2/8/2023).

## 2022-06-02 ENCOUNTER — LAB VISIT (OUTPATIENT)
Dept: LAB | Facility: HOSPITAL | Age: 87
End: 2022-06-02
Attending: FAMILY MEDICINE
Payer: MEDICARE

## 2022-06-02 DIAGNOSIS — E21.3 HYPERPARATHYROIDISM: ICD-10-CM

## 2022-06-02 DIAGNOSIS — N18.31 CHRONIC RENAL FAILURE, STAGE 3A: ICD-10-CM

## 2022-06-02 DIAGNOSIS — I10 ESSENTIAL HYPERTENSION, BENIGN: ICD-10-CM

## 2022-06-02 LAB
ALBUMIN SERPL BCP-MCNC: 3.9 G/DL (ref 3.5–5.2)
ALP SERPL-CCNC: 72 U/L (ref 55–135)
ALT SERPL W/O P-5'-P-CCNC: 16 U/L (ref 10–44)
ANION GAP SERPL CALC-SCNC: 11 MMOL/L (ref 8–16)
AST SERPL-CCNC: 23 U/L (ref 10–40)
BASOPHILS # BLD AUTO: 0.04 K/UL (ref 0–0.2)
BASOPHILS NFR BLD: 0.6 % (ref 0–1.9)
BILIRUB SERPL-MCNC: 0.9 MG/DL (ref 0.1–1)
BUN SERPL-MCNC: 21 MG/DL (ref 8–23)
CALCIUM SERPL-MCNC: 9.8 MG/DL (ref 8.7–10.5)
CHLORIDE SERPL-SCNC: 104 MMOL/L (ref 95–110)
CO2 SERPL-SCNC: 26 MMOL/L (ref 23–29)
CREAT SERPL-MCNC: 1 MG/DL (ref 0.5–1.4)
DIFFERENTIAL METHOD: ABNORMAL
EOSINOPHIL # BLD AUTO: 0.1 K/UL (ref 0–0.5)
EOSINOPHIL NFR BLD: 2 % (ref 0–8)
ERYTHROCYTE [DISTWIDTH] IN BLOOD BY AUTOMATED COUNT: 15.1 % (ref 11.5–14.5)
EST. GFR  (AFRICAN AMERICAN): 59 ML/MIN/1.73 M^2
EST. GFR  (NON AFRICAN AMERICAN): 51 ML/MIN/1.73 M^2
GLUCOSE SERPL-MCNC: 94 MG/DL (ref 70–110)
HCT VFR BLD AUTO: 43.2 % (ref 37–48.5)
HGB BLD-MCNC: 13.9 G/DL (ref 12–16)
IMM GRANULOCYTES # BLD AUTO: 0.01 K/UL (ref 0–0.04)
IMM GRANULOCYTES NFR BLD AUTO: 0.2 % (ref 0–0.5)
LYMPHOCYTES # BLD AUTO: 2.9 K/UL (ref 1–4.8)
LYMPHOCYTES NFR BLD: 45.1 % (ref 18–48)
MCH RBC QN AUTO: 29.4 PG (ref 27–31)
MCHC RBC AUTO-ENTMCNC: 32.2 G/DL (ref 32–36)
MCV RBC AUTO: 91 FL (ref 82–98)
MONOCYTES # BLD AUTO: 0.6 K/UL (ref 0.3–1)
MONOCYTES NFR BLD: 9.9 % (ref 4–15)
NEUTROPHILS # BLD AUTO: 2.7 K/UL (ref 1.8–7.7)
NEUTROPHILS NFR BLD: 42.2 % (ref 38–73)
NRBC BLD-RTO: 0 /100 WBC
PLATELET # BLD AUTO: 244 K/UL (ref 150–450)
PMV BLD AUTO: 11.2 FL (ref 9.2–12.9)
POTASSIUM SERPL-SCNC: 4.1 MMOL/L (ref 3.5–5.1)
PROT SERPL-MCNC: 7.5 G/DL (ref 6–8.4)
PTH-INTACT SERPL-MCNC: 72.3 PG/ML (ref 9–77)
RBC # BLD AUTO: 4.73 M/UL (ref 4–5.4)
SODIUM SERPL-SCNC: 141 MMOL/L (ref 136–145)
WBC # BLD AUTO: 6.48 K/UL (ref 3.9–12.7)

## 2022-06-02 PROCEDURE — 36415 COLL VENOUS BLD VENIPUNCTURE: CPT | Mod: PN | Performed by: FAMILY MEDICINE

## 2022-06-02 PROCEDURE — 83970 ASSAY OF PARATHORMONE: CPT | Performed by: FAMILY MEDICINE

## 2022-06-02 PROCEDURE — 85025 COMPLETE CBC W/AUTO DIFF WBC: CPT | Performed by: FAMILY MEDICINE

## 2022-06-02 PROCEDURE — 80053 COMPREHEN METABOLIC PANEL: CPT | Performed by: FAMILY MEDICINE

## 2022-06-09 ENCOUNTER — OFFICE VISIT (OUTPATIENT)
Dept: FAMILY MEDICINE | Facility: CLINIC | Age: 87
End: 2022-06-09
Payer: MEDICARE

## 2022-06-09 VITALS
RESPIRATION RATE: 20 BRPM | SYSTOLIC BLOOD PRESSURE: 147 MMHG | BODY MASS INDEX: 27.81 KG/M2 | HEART RATE: 60 BPM | OXYGEN SATURATION: 96 % | DIASTOLIC BLOOD PRESSURE: 67 MMHG | HEIGHT: 63 IN | WEIGHT: 156.94 LBS

## 2022-06-09 DIAGNOSIS — N18.31 CHRONIC RENAL FAILURE, STAGE 3A: ICD-10-CM

## 2022-06-09 DIAGNOSIS — I10 ESSENTIAL HYPERTENSION, BENIGN: Primary | ICD-10-CM

## 2022-06-09 DIAGNOSIS — Z23 NEED FOR VACCINATION: ICD-10-CM

## 2022-06-09 DIAGNOSIS — M81.0 AGE-RELATED OSTEOPOROSIS WITHOUT CURRENT PATHOLOGICAL FRACTURE: ICD-10-CM

## 2022-06-09 DIAGNOSIS — E21.3 HYPERPARATHYROIDISM: ICD-10-CM

## 2022-06-09 LAB
ALBUMIN/CREAT UR: NORMAL UG/MG (ref 0–30)
CREAT UR-MCNC: 85 MG/DL (ref 15–325)
MICROALBUMIN UR DL<=1MG/L-MCNC: <5 UG/ML

## 2022-06-09 PROCEDURE — 1126F AMNT PAIN NOTED NONE PRSNT: CPT | Mod: CPTII,S$GLB,, | Performed by: FAMILY MEDICINE

## 2022-06-09 PROCEDURE — 99214 OFFICE O/P EST MOD 30 MIN: CPT | Mod: S$GLB,,, | Performed by: FAMILY MEDICINE

## 2022-06-09 PROCEDURE — 1101F PR PT FALLS ASSESS DOC 0-1 FALLS W/OUT INJ PAST YR: ICD-10-PCS | Mod: CPTII,S$GLB,, | Performed by: FAMILY MEDICINE

## 2022-06-09 PROCEDURE — 82570 ASSAY OF URINE CREATININE: CPT | Performed by: FAMILY MEDICINE

## 2022-06-09 PROCEDURE — 99214 PR OFFICE/OUTPT VISIT, EST, LEVL IV, 30-39 MIN: ICD-10-PCS | Mod: S$GLB,,, | Performed by: FAMILY MEDICINE

## 2022-06-09 PROCEDURE — 3288F FALL RISK ASSESSMENT DOCD: CPT | Mod: CPTII,S$GLB,, | Performed by: FAMILY MEDICINE

## 2022-06-09 PROCEDURE — 1159F MED LIST DOCD IN RCRD: CPT | Mod: CPTII,S$GLB,, | Performed by: FAMILY MEDICINE

## 2022-06-09 PROCEDURE — 99999 PR PBB SHADOW E&M-EST. PATIENT-LVL IV: ICD-10-PCS | Mod: PBBFAC,,, | Performed by: FAMILY MEDICINE

## 2022-06-09 PROCEDURE — 1160F RVW MEDS BY RX/DR IN RCRD: CPT | Mod: CPTII,S$GLB,, | Performed by: FAMILY MEDICINE

## 2022-06-09 PROCEDURE — 1126F PR PAIN SEVERITY QUANTIFIED, NO PAIN PRESENT: ICD-10-PCS | Mod: CPTII,S$GLB,, | Performed by: FAMILY MEDICINE

## 2022-06-09 PROCEDURE — 99999 PR PBB SHADOW E&M-EST. PATIENT-LVL IV: CPT | Mod: PBBFAC,,, | Performed by: FAMILY MEDICINE

## 2022-06-09 PROCEDURE — 3288F PR FALLS RISK ASSESSMENT DOCUMENTED: ICD-10-PCS | Mod: CPTII,S$GLB,, | Performed by: FAMILY MEDICINE

## 2022-06-09 PROCEDURE — 1160F PR REVIEW ALL MEDS BY PRESCRIBER/CLIN PHARMACIST DOCUMENTED: ICD-10-PCS | Mod: CPTII,S$GLB,, | Performed by: FAMILY MEDICINE

## 2022-06-09 PROCEDURE — 1159F PR MEDICATION LIST DOCUMENTED IN MEDICAL RECORD: ICD-10-PCS | Mod: CPTII,S$GLB,, | Performed by: FAMILY MEDICINE

## 2022-06-09 PROCEDURE — 82043 UR ALBUMIN QUANTITATIVE: CPT | Performed by: FAMILY MEDICINE

## 2022-06-09 PROCEDURE — 1101F PT FALLS ASSESS-DOCD LE1/YR: CPT | Mod: CPTII,S$GLB,, | Performed by: FAMILY MEDICINE

## 2022-07-03 NOTE — PROGRESS NOTES
"Subjective:       Patient ID: Debbie Earl is a 86 y.o. female.    Chief Complaint: Hypertension    Hypertension  This is a chronic problem. The current episode started more than 1 year ago. Pertinent negatives include no anxiety, blurred vision, chest pain, headaches, palpitations, peripheral edema, PND, shortness of breath or sweats. Risk factors for coronary artery disease include post-menopausal state. The current treatment provides significant improvement. There are no compliance problems.  Hypertensive end-organ damage includes kidney disease.     Review of Systems   Constitutional: Negative for unexpected weight change.   HENT: Negative for ear pain and sore throat.    Eyes: Negative for blurred vision and visual disturbance.   Respiratory: Negative for shortness of breath.    Cardiovascular: Negative for chest pain, palpitations and PND.   Gastrointestinal: Negative for abdominal pain and blood in stool.   Genitourinary: Negative for dysuria and frequency.   Musculoskeletal: Positive for arthralgias (knees occasionally). Negative for joint swelling.   Integumentary:  Negative for rash.   Neurological: Negative for weakness, numbness and headaches.   Hematological: Negative for adenopathy.   Psychiatric/Behavioral: Negative for suicidal ideas.         Objective:     BP (!) 147/67 (BP Location: Left arm, Patient Position: Sitting, BP Method: Large (Automatic))   Pulse 60   Resp 20   Ht 5' 3" (1.6 m)   Wt 71.2 kg (156 lb 15.5 oz)   SpO2 96%   BMI 27.81 kg/m²     Physical Exam  Vitals reviewed.   Constitutional:       General: She is not in acute distress.  HENT:      Head: Normocephalic and atraumatic.      Right Ear: Ear canal and external ear normal.      Left Ear: Ear canal and external ear normal.      Nose: Nose normal.      Mouth/Throat:      Mouth: Mucous membranes are moist.      Pharynx: No oropharyngeal exudate or posterior oropharyngeal erythema.   Eyes:      Extraocular Movements: Extraocular " movements intact.      Conjunctiva/sclera: Conjunctivae normal.      Pupils: Pupils are equal, round, and reactive to light.   Cardiovascular:      Rate and Rhythm: Normal rate and regular rhythm.      Pulses: Normal pulses.      Heart sounds: Normal heart sounds.   Pulmonary:      Effort: Pulmonary effort is normal. No respiratory distress.      Breath sounds: No wheezing or rales.   Abdominal:      General: Abdomen is flat. Bowel sounds are normal. There is no distension.      Palpations: Abdomen is soft.      Tenderness: There is no abdominal tenderness. There is no guarding.   Musculoskeletal:      Cervical back: Normal range of motion. No rigidity or tenderness.   Lymphadenopathy:      Cervical: No cervical adenopathy.   Skin:     General: Skin is warm.      Capillary Refill: Capillary refill takes less than 2 seconds.   Neurological:      Mental Status: She is alert and oriented to person, place, and time.      Cranial Nerves: No cranial nerve deficit.      Sensory: No sensory deficit.   Psychiatric:         Mood and Affect: Mood normal.         Behavior: Behavior normal.         Office Visit on 06/09/2022   Component Date Value Ref Range Status    Microalbumin, Urine 06/09/2022 <5.0  ug/mL Final    Creatinine, Urine 06/09/2022 85.0  15.0 - 325.0 mg/dL Final    Microalb/Creat Ratio 06/09/2022 Unable to calculate  0.0 - 30.0 ug/mg Final   Lab Visit on 06/02/2022   Component Date Value Ref Range Status    Sodium 06/02/2022 141  136 - 145 mmol/L Final    Potassium 06/02/2022 4.1  3.5 - 5.1 mmol/L Final    Chloride 06/02/2022 104  95 - 110 mmol/L Final    CO2 06/02/2022 26  23 - 29 mmol/L Final    Glucose 06/02/2022 94  70 - 110 mg/dL Final    BUN 06/02/2022 21  8 - 23 mg/dL Final    Creatinine 06/02/2022 1.0  0.5 - 1.4 mg/dL Final    Calcium 06/02/2022 9.8  8.7 - 10.5 mg/dL Final    Total Protein 06/02/2022 7.5  6.0 - 8.4 g/dL Final    Albumin 06/02/2022 3.9  3.5 - 5.2 g/dL Final    Total Bilirubin  06/02/2022 0.9  0.1 - 1.0 mg/dL Final    Comment: For infants and newborns, interpretation of results should be based  on gestational age, weight and in agreement with clinical  observations.    Premature Infant recommended reference ranges:  Up to 24 hours.............<8.0 mg/dL  Up to 48 hours............<12.0 mg/dL  3-5 days..................<15.0 mg/dL  6-29 days.................<15.0 mg/dL      Alkaline Phosphatase 06/02/2022 72  55 - 135 U/L Final    AST 06/02/2022 23  10 - 40 U/L Final    ALT 06/02/2022 16  10 - 44 U/L Final    Anion Gap 06/02/2022 11  8 - 16 mmol/L Final    eGFR if  06/02/2022 59 (A) >60 mL/min/1.73 m^2 Final    eGFR if non African American 06/02/2022 51 (A) >60 mL/min/1.73 m^2 Final    Comment: Calculation used to obtain the estimated glomerular filtration  rate (eGFR) is the CKD-EPI equation.       PTH, Intact 06/02/2022 72.3  9.0 - 77.0 pg/mL Final    WBC 06/02/2022 6.48  3.90 - 12.70 K/uL Final    RBC 06/02/2022 4.73  4.00 - 5.40 M/uL Final    Hemoglobin 06/02/2022 13.9  12.0 - 16.0 g/dL Final    Hematocrit 06/02/2022 43.2  37.0 - 48.5 % Final    MCV 06/02/2022 91  82 - 98 fL Final    MCH 06/02/2022 29.4  27.0 - 31.0 pg Final    MCHC 06/02/2022 32.2  32.0 - 36.0 g/dL Final    RDW 06/02/2022 15.1 (A) 11.5 - 14.5 % Final    Platelets 06/02/2022 244  150 - 450 K/uL Final    MPV 06/02/2022 11.2  9.2 - 12.9 fL Final    Immature Granulocytes 06/02/2022 0.2  0.0 - 0.5 % Final    Gran # (ANC) 06/02/2022 2.7  1.8 - 7.7 K/uL Final    Immature Grans (Abs) 06/02/2022 0.01  0.00 - 0.04 K/uL Final    Comment: Mild elevation in immature granulocytes is non specific and   can be seen in a variety of conditions including stress response,   acute inflammation, trauma and pregnancy. Correlation with other   laboratory and clinical findings is essential.      Lymph # 06/02/2022 2.9  1.0 - 4.8 K/uL Final    Mono # 06/02/2022 0.6  0.3 - 1.0 K/uL Final    Eos # 06/02/2022  0.1  0.0 - 0.5 K/uL Final    Baso # 06/02/2022 0.04  0.00 - 0.20 K/uL Final    nRBC 06/02/2022 0  0 /100 WBC Final    Gran % 06/02/2022 42.2  38.0 - 73.0 % Final    Lymph % 06/02/2022 45.1  18.0 - 48.0 % Final    Mono % 06/02/2022 9.9  4.0 - 15.0 % Final    Eosinophil % 06/02/2022 2.0  0.0 - 8.0 % Final    Basophil % 06/02/2022 0.6  0.0 - 1.9 % Final    Differential Method 06/02/2022 Automated   Final       Assessment:       Problem List Items Addressed This Visit        Cardiac/Vascular    Essential hypertension, benign - Primary    Relevant Orders    Microalbumin/Creatinine Ratio, Urine (Completed)    Comprehensive Metabolic Panel    CBC Auto Differential    TSH       Renal/    Chronic renal failure, stage 3a    Relevant Orders    Comprehensive Metabolic Panel    CBC Auto Differential    TSH    Vitamin D       Orthopedic    Osteoporosis    Relevant Orders    CBC Auto Differential    TSH      Other Visit Diagnoses     Hyperparathyroidism        Relevant Orders    Comprehensive Metabolic Panel    CBC Auto Differential    TSH    Vitamin D    Need for vaccination              Plan:     Debbie was seen today for hypertension.    Diagnoses and all orders for this visit:    Essential hypertension, benign  -     Microalbumin/Creatinine Ratio, Urine  -     Comprehensive Metabolic Panel; Future  -     CBC Auto Differential; Future  -     TSH; Future  Fair BP control for her age.  Continue current regimen.    Chronic renal failure, stage 3a  -     Comprehensive Metabolic Panel; Future  -     CBC Auto Differential; Future  -     TSH; Future  -     Vitamin D; Future  Renal function stable  Continue current treatment    Hyperparathyroidism  -     Comprehensive Metabolic Panel; Future  -     CBC Auto Differential; Future  -     TSH; Future  -     Vitamin D; Future  Continue Calcium-D supplement    Age-related osteoporosis without current pathological fracture  -     CBC Auto Differential; Future  -     TSH;  Future    Need for vaccination  -     pneumococcal 23-hermelinda ps (PNEUMOVAX 23) 25 mcg/0.5 mL vaccine; Inject 0.5 mLs into the muscle once. for 1 dose

## 2022-07-30 DIAGNOSIS — I10 ESSENTIAL HYPERTENSION, BENIGN: ICD-10-CM

## 2022-08-01 RX ORDER — HYDRALAZINE HYDROCHLORIDE 50 MG/1
TABLET, FILM COATED ORAL
Qty: 180 TABLET | Refills: 1 | Status: SHIPPED | OUTPATIENT
Start: 2022-08-01 | End: 2023-02-01

## 2022-08-01 NOTE — TELEPHONE ENCOUNTER
Refill Routing Note   Medication(s) are not appropriate for processing by [facility] for the following reason(s):      - Outside of protocol    [facility] action(s):  Route          Medication reconciliation completed: No     Appointments  past 12m or future 3m with PCP    Date Provider   Last Visit   6/9/2022 Vasiliy Oneill Jr., MD   Next Visit   12/12/2022 Vasiliy Oneill Jr., MD   ED visits in past 90 days: 0        Note composed:8:53 AM 08/01/2022

## 2022-12-09 ENCOUNTER — LAB VISIT (OUTPATIENT)
Dept: LAB | Facility: HOSPITAL | Age: 87
End: 2022-12-09
Attending: FAMILY MEDICINE
Payer: MEDICARE

## 2022-12-09 DIAGNOSIS — I10 ESSENTIAL HYPERTENSION, BENIGN: ICD-10-CM

## 2022-12-09 DIAGNOSIS — M81.0 AGE-RELATED OSTEOPOROSIS WITHOUT CURRENT PATHOLOGICAL FRACTURE: ICD-10-CM

## 2022-12-09 DIAGNOSIS — N18.31 CHRONIC RENAL FAILURE, STAGE 3A: ICD-10-CM

## 2022-12-09 DIAGNOSIS — E21.3 HYPERPARATHYROIDISM: ICD-10-CM

## 2022-12-09 LAB
25(OH)D3+25(OH)D2 SERPL-MCNC: 54 NG/ML (ref 30–96)
ALBUMIN SERPL BCP-MCNC: 3.8 G/DL (ref 3.5–5.2)
ALP SERPL-CCNC: 67 U/L (ref 55–135)
ALT SERPL W/O P-5'-P-CCNC: 13 U/L (ref 10–44)
ANION GAP SERPL CALC-SCNC: 11 MMOL/L (ref 8–16)
AST SERPL-CCNC: 29 U/L (ref 10–40)
BASOPHILS # BLD AUTO: 0.02 K/UL (ref 0–0.2)
BASOPHILS NFR BLD: 0.3 % (ref 0–1.9)
BILIRUB SERPL-MCNC: 0.9 MG/DL (ref 0.1–1)
BUN SERPL-MCNC: 20 MG/DL (ref 8–23)
CALCIUM SERPL-MCNC: 10 MG/DL (ref 8.7–10.5)
CHLORIDE SERPL-SCNC: 103 MMOL/L (ref 95–110)
CO2 SERPL-SCNC: 28 MMOL/L (ref 23–29)
CREAT SERPL-MCNC: 1.2 MG/DL (ref 0.5–1.4)
DIFFERENTIAL METHOD: ABNORMAL
EOSINOPHIL # BLD AUTO: 0.1 K/UL (ref 0–0.5)
EOSINOPHIL NFR BLD: 2.3 % (ref 0–8)
ERYTHROCYTE [DISTWIDTH] IN BLOOD BY AUTOMATED COUNT: 14.1 % (ref 11.5–14.5)
EST. GFR  (NO RACE VARIABLE): 44 ML/MIN/1.73 M^2
GLUCOSE SERPL-MCNC: 95 MG/DL (ref 70–110)
HCT VFR BLD AUTO: 43.5 % (ref 37–48.5)
HGB BLD-MCNC: 14.4 G/DL (ref 12–16)
IMM GRANULOCYTES # BLD AUTO: 0.01 K/UL (ref 0–0.04)
IMM GRANULOCYTES NFR BLD AUTO: 0.2 % (ref 0–0.5)
LYMPHOCYTES # BLD AUTO: 2.9 K/UL (ref 1–4.8)
LYMPHOCYTES NFR BLD: 47.3 % (ref 18–48)
MCH RBC QN AUTO: 29.4 PG (ref 27–31)
MCHC RBC AUTO-ENTMCNC: 33.1 G/DL (ref 32–36)
MCV RBC AUTO: 89 FL (ref 82–98)
MONOCYTES # BLD AUTO: 0.5 K/UL (ref 0.3–1)
MONOCYTES NFR BLD: 8.9 % (ref 4–15)
NEUTROPHILS # BLD AUTO: 2.5 K/UL (ref 1.8–7.7)
NEUTROPHILS NFR BLD: 41 % (ref 38–73)
NRBC BLD-RTO: 0 /100 WBC
PLATELET # BLD AUTO: ABNORMAL K/UL (ref 150–450)
PLATELET BLD QL SMEAR: ABNORMAL
PMV BLD AUTO: ABNORMAL FL (ref 9.2–12.9)
POTASSIUM SERPL-SCNC: 4 MMOL/L (ref 3.5–5.1)
PROT SERPL-MCNC: 7.9 G/DL (ref 6–8.4)
RBC # BLD AUTO: 4.9 M/UL (ref 4–5.4)
SODIUM SERPL-SCNC: 142 MMOL/L (ref 136–145)
T4 FREE SERPL-MCNC: 0.85 NG/DL (ref 0.71–1.51)
TSH SERPL DL<=0.005 MIU/L-ACNC: 5.64 UIU/ML (ref 0.4–4)
WBC # BLD AUTO: 6.05 K/UL (ref 3.9–12.7)

## 2022-12-09 PROCEDURE — 84439 ASSAY OF FREE THYROXINE: CPT | Performed by: FAMILY MEDICINE

## 2022-12-09 PROCEDURE — 80053 COMPREHEN METABOLIC PANEL: CPT | Performed by: FAMILY MEDICINE

## 2022-12-09 PROCEDURE — 84443 ASSAY THYROID STIM HORMONE: CPT | Performed by: FAMILY MEDICINE

## 2022-12-09 PROCEDURE — 85025 COMPLETE CBC W/AUTO DIFF WBC: CPT | Performed by: FAMILY MEDICINE

## 2022-12-09 PROCEDURE — 36415 COLL VENOUS BLD VENIPUNCTURE: CPT | Mod: PN | Performed by: FAMILY MEDICINE

## 2022-12-09 PROCEDURE — 82306 VITAMIN D 25 HYDROXY: CPT | Performed by: FAMILY MEDICINE

## 2022-12-12 ENCOUNTER — OFFICE VISIT (OUTPATIENT)
Dept: FAMILY MEDICINE | Facility: CLINIC | Age: 87
End: 2022-12-12
Payer: MEDICARE

## 2022-12-12 ENCOUNTER — IMMUNIZATION (OUTPATIENT)
Dept: PHARMACY | Facility: CLINIC | Age: 87
End: 2022-12-12
Payer: MEDICARE

## 2022-12-12 VITALS
DIASTOLIC BLOOD PRESSURE: 74 MMHG | HEART RATE: 66 BPM | TEMPERATURE: 98 F | BODY MASS INDEX: 27.7 KG/M2 | WEIGHT: 156.31 LBS | OXYGEN SATURATION: 95 % | SYSTOLIC BLOOD PRESSURE: 118 MMHG | HEIGHT: 63 IN

## 2022-12-12 DIAGNOSIS — I10 ESSENTIAL HYPERTENSION, BENIGN: Primary | Chronic | ICD-10-CM

## 2022-12-12 DIAGNOSIS — R79.89 HIGH SERUM THYROID STIMULATING HORMONE (TSH): ICD-10-CM

## 2022-12-12 DIAGNOSIS — N18.31 STAGE 3A CHRONIC KIDNEY DISEASE: Chronic | ICD-10-CM

## 2022-12-12 DIAGNOSIS — E21.3 HYPERPARATHYROIDISM: Chronic | ICD-10-CM

## 2022-12-12 PROCEDURE — 3288F FALL RISK ASSESSMENT DOCD: CPT | Mod: CPTII,S$GLB,, | Performed by: FAMILY MEDICINE

## 2022-12-12 PROCEDURE — 1126F PR PAIN SEVERITY QUANTIFIED, NO PAIN PRESENT: ICD-10-PCS | Mod: CPTII,S$GLB,, | Performed by: FAMILY MEDICINE

## 2022-12-12 PROCEDURE — 1160F PR REVIEW ALL MEDS BY PRESCRIBER/CLIN PHARMACIST DOCUMENTED: ICD-10-PCS | Mod: CPTII,S$GLB,, | Performed by: FAMILY MEDICINE

## 2022-12-12 PROCEDURE — 99999 PR PBB SHADOW E&M-EST. PATIENT-LVL III: ICD-10-PCS | Mod: PBBFAC,,, | Performed by: FAMILY MEDICINE

## 2022-12-12 PROCEDURE — 1160F RVW MEDS BY RX/DR IN RCRD: CPT | Mod: CPTII,S$GLB,, | Performed by: FAMILY MEDICINE

## 2022-12-12 PROCEDURE — 1101F PT FALLS ASSESS-DOCD LE1/YR: CPT | Mod: CPTII,S$GLB,, | Performed by: FAMILY MEDICINE

## 2022-12-12 PROCEDURE — 99214 OFFICE O/P EST MOD 30 MIN: CPT | Mod: S$GLB,,, | Performed by: FAMILY MEDICINE

## 2022-12-12 PROCEDURE — 3288F PR FALLS RISK ASSESSMENT DOCUMENTED: ICD-10-PCS | Mod: CPTII,S$GLB,, | Performed by: FAMILY MEDICINE

## 2022-12-12 PROCEDURE — 1126F AMNT PAIN NOTED NONE PRSNT: CPT | Mod: CPTII,S$GLB,, | Performed by: FAMILY MEDICINE

## 2022-12-12 PROCEDURE — 99999 PR PBB SHADOW E&M-EST. PATIENT-LVL III: CPT | Mod: PBBFAC,,, | Performed by: FAMILY MEDICINE

## 2022-12-12 PROCEDURE — 1159F PR MEDICATION LIST DOCUMENTED IN MEDICAL RECORD: ICD-10-PCS | Mod: CPTII,S$GLB,, | Performed by: FAMILY MEDICINE

## 2022-12-12 PROCEDURE — 1159F MED LIST DOCD IN RCRD: CPT | Mod: CPTII,S$GLB,, | Performed by: FAMILY MEDICINE

## 2022-12-12 PROCEDURE — 1101F PR PT FALLS ASSESS DOC 0-1 FALLS W/OUT INJ PAST YR: ICD-10-PCS | Mod: CPTII,S$GLB,, | Performed by: FAMILY MEDICINE

## 2022-12-12 PROCEDURE — 99214 PR OFFICE/OUTPT VISIT, EST, LEVL IV, 30-39 MIN: ICD-10-PCS | Mod: S$GLB,,, | Performed by: FAMILY MEDICINE

## 2022-12-18 ENCOUNTER — PATIENT MESSAGE (OUTPATIENT)
Dept: FAMILY MEDICINE | Facility: CLINIC | Age: 87
End: 2022-12-18
Payer: MEDICARE

## 2022-12-18 PROBLEM — E21.3 HYPERPARATHYROIDISM: Status: ACTIVE | Noted: 2022-12-18

## 2022-12-18 NOTE — PROGRESS NOTES
"Subjective:       Patient ID: Debbie Earl is a 87 y.o. female.    Chief Complaint: Hypertension and Chronic Kidney Disease    Hypertension  This is a chronic problem. The current episode started more than 1 year ago. Pertinent negatives include no anxiety, blurred vision, chest pain, headaches, palpitations, peripheral edema, PND, shortness of breath or sweats. Risk factors for coronary artery disease include post-menopausal state. The current treatment provides significant improvement. There are no compliance problems.  Hypertensive end-organ damage includes kidney disease.   Review of Systems   Constitutional:  Negative for unexpected weight change.   HENT:  Negative for ear pain and sore throat.    Eyes:  Negative for blurred vision and visual disturbance.   Respiratory:  Negative for shortness of breath.    Cardiovascular:  Negative for chest pain, palpitations and PND.   Gastrointestinal:  Negative for abdominal pain and blood in stool.   Genitourinary:  Negative for dysuria and frequency.   Musculoskeletal:  Positive for arthralgias (knees occasionally). Negative for joint swelling.   Integumentary:  Negative for rash.   Neurological:  Negative for weakness, numbness and headaches.   Hematological:  Negative for adenopathy.   Psychiatric/Behavioral:  Negative for suicidal ideas.        Objective:     /74 (BP Location: Right arm, Patient Position: Sitting, BP Method: Large (Manual))   Pulse 66   Temp 97.8 °F (36.6 °C) (Oral)   Ht 5' 3" (1.6 m)   Wt 70.9 kg (156 lb 4.9 oz)   SpO2 95%   BMI 27.69 kg/m²     Physical Exam  Vitals reviewed.   Constitutional:       General: She is not in acute distress.  HENT:      Head: Normocephalic and atraumatic.      Right Ear: Ear canal and external ear normal.      Left Ear: Ear canal and external ear normal.      Nose: Nose normal.      Mouth/Throat:      Mouth: Mucous membranes are moist.      Pharynx: No oropharyngeal exudate or posterior oropharyngeal erythema. "   Eyes:      Extraocular Movements: Extraocular movements intact.      Conjunctiva/sclera: Conjunctivae normal.      Pupils: Pupils are equal, round, and reactive to light.   Cardiovascular:      Rate and Rhythm: Normal rate and regular rhythm.      Pulses: Normal pulses.      Heart sounds: Normal heart sounds.   Pulmonary:      Effort: Pulmonary effort is normal. No respiratory distress.      Breath sounds: No wheezing or rales.   Abdominal:      General: Abdomen is flat. Bowel sounds are normal. There is no distension.      Palpations: Abdomen is soft.      Tenderness: There is no abdominal tenderness. There is no guarding.   Musculoskeletal:      Cervical back: Normal range of motion. No rigidity or tenderness.   Lymphadenopathy:      Cervical: No cervical adenopathy.   Skin:     General: Skin is warm.      Capillary Refill: Capillary refill takes less than 2 seconds.   Neurological:      Mental Status: She is alert and oriented to person, place, and time.      Cranial Nerves: No cranial nerve deficit.      Sensory: No sensory deficit.   Psychiatric:         Mood and Affect: Mood normal.         Behavior: Behavior normal.       Lab Visit on 12/09/2022   Component Date Value Ref Range Status    Sodium 12/09/2022 142  136 - 145 mmol/L Final    Potassium 12/09/2022 4.0  3.5 - 5.1 mmol/L Final    Specimen moderately hemolyzed    Chloride 12/09/2022 103  95 - 110 mmol/L Final    CO2 12/09/2022 28  23 - 29 mmol/L Final    Glucose 12/09/2022 95  70 - 110 mg/dL Final    BUN 12/09/2022 20  8 - 23 mg/dL Final    Creatinine 12/09/2022 1.2  0.5 - 1.4 mg/dL Final    Calcium 12/09/2022 10.0  8.7 - 10.5 mg/dL Final    Total Protein 12/09/2022 7.9  6.0 - 8.4 g/dL Final    Albumin 12/09/2022 3.8  3.5 - 5.2 g/dL Final    Total Bilirubin 12/09/2022 0.9  0.1 - 1.0 mg/dL Final    Comment: For infants and newborns, interpretation of results should be based  on gestational age, weight and in agreement with  clinical  observations.    Premature Infant recommended reference ranges:  Up to 24 hours.............<8.0 mg/dL  Up to 48 hours............<12.0 mg/dL  3-5 days..................<15.0 mg/dL  6-29 days.................<15.0 mg/dL      Alkaline Phosphatase 12/09/2022 67  55 - 135 U/L Final    AST 12/09/2022 29  10 - 40 U/L Final    ALT 12/09/2022 13  10 - 44 U/L Final    Anion Gap 12/09/2022 11  8 - 16 mmol/L Final    eGFR 12/09/2022 44 (A)  >60 mL/min/1.73 m^2 Final    WBC 12/09/2022 6.05  3.90 - 12.70 K/uL Final    RBC 12/09/2022 4.90  4.00 - 5.40 M/uL Final    Hemoglobin 12/09/2022 14.4  12.0 - 16.0 g/dL Final    Hematocrit 12/09/2022 43.5  37.0 - 48.5 % Final    MCV 12/09/2022 89  82 - 98 fL Final    MCH 12/09/2022 29.4  27.0 - 31.0 pg Final    MCHC 12/09/2022 33.1  32.0 - 36.0 g/dL Final    RDW 12/09/2022 14.1  11.5 - 14.5 % Final    Platelets 12/09/2022 SEE COMMENT  150 - 450 K/uL Final    Unable to report platelet count due to clumping.    MPV 12/09/2022 SEE COMMENT  9.2 - 12.9 fL Final    Result not available.    Immature Granulocytes 12/09/2022 0.2  0.0 - 0.5 % Final    Gran # (ANC) 12/09/2022 2.5  1.8 - 7.7 K/uL Final    Immature Grans (Abs) 12/09/2022 0.01  0.00 - 0.04 K/uL Final    Comment: Mild elevation in immature granulocytes is non specific and   can be seen in a variety of conditions including stress response,   acute inflammation, trauma and pregnancy. Correlation with other   laboratory and clinical findings is essential.      Lymph # 12/09/2022 2.9  1.0 - 4.8 K/uL Final    Mono # 12/09/2022 0.5  0.3 - 1.0 K/uL Final    Eos # 12/09/2022 0.1  0.0 - 0.5 K/uL Final    Baso # 12/09/2022 0.02  0.00 - 0.20 K/uL Final    nRBC 12/09/2022 0  0 /100 WBC Final    Gran % 12/09/2022 41.0  38.0 - 73.0 % Final    Lymph % 12/09/2022 47.3  18.0 - 48.0 % Final    Mono % 12/09/2022 8.9  4.0 - 15.0 % Final    Eosinophil % 12/09/2022 2.3  0.0 - 8.0 % Final    Basophil % 12/09/2022 0.3  0.0 - 1.9 % Final    Platelet  Estimate 12/09/2022 Clumped (A)   Final    Differential Method 12/09/2022 Automated   Final    TSH 12/09/2022 5.645 (H)  0.400 - 4.000 uIU/mL Final    Vit D, 25-Hydroxy 12/09/2022 54  30 - 96 ng/mL Final    Comment: Vitamin D deficiency.........<10 ng/mL                              Vitamin D insufficiency......10-29 ng/mL       Vitamin D sufficiency........> or equal to 30 ng/mL  Vitamin D toxicity............>100 ng/mL      Free T4 12/09/2022 0.85  0.71 - 1.51 ng/dL Final       Assessment:       Problem List Items Addressed This Visit          Cardiac/Vascular    Essential hypertension, benign - Primary    Relevant Orders    Comprehensive Metabolic Panel    Lipid Panel    TSH       Renal/    Stage 3a chronic kidney disease    Relevant Orders    CBC Auto Differential    PTH, intact    Protein / creatinine ratio, urine    TSH       Endocrine    Hyperparathyroidism    Relevant Orders    PTH, intact    Vitamin D    TSH     Other Visit Diagnoses       High serum thyroid stimulating hormone (TSH)        Relevant Orders    TSH              Plan:   Debbie was seen today for hypertension and chronic kidney disease.    Diagnoses and all orders for this visit:    Essential hypertension, benign  -     Comprehensive Metabolic Panel; Future  -     Lipid Panel; Future  -     TSH; Future  Current therapy effective, will continue    Stage 3a chronic kidney disease  -     CBC Auto Differential; Future  -     PTH, intact; Future  -     Protein / creatinine ratio, urine; Future  -     TSH; Future  Continue avoiding nephrotic agents  Encouraged good hydration    Hyperparathyroidism  -     PTH, intact; Future  -     Vitamin D; Future  -     TSH; Future  Check PTH and Vit D    High serum thyroid stimulating hormone (TSH)  -     TSH; Future

## 2023-02-07 DIAGNOSIS — Z00.00 ENCOUNTER FOR MEDICARE ANNUAL WELLNESS EXAM: ICD-10-CM

## 2023-02-09 DIAGNOSIS — Z00.00 ENCOUNTER FOR MEDICARE ANNUAL WELLNESS EXAM: ICD-10-CM

## 2023-06-10 ENCOUNTER — LAB VISIT (OUTPATIENT)
Dept: LAB | Facility: HOSPITAL | Age: 88
End: 2023-06-10
Attending: FAMILY MEDICINE
Payer: MEDICARE

## 2023-06-10 DIAGNOSIS — R79.89 HIGH SERUM THYROID STIMULATING HORMONE (TSH): ICD-10-CM

## 2023-06-10 DIAGNOSIS — E21.3 HYPERPARATHYROIDISM: Chronic | ICD-10-CM

## 2023-06-10 DIAGNOSIS — I10 ESSENTIAL HYPERTENSION, BENIGN: Chronic | ICD-10-CM

## 2023-06-10 DIAGNOSIS — N18.31 STAGE 3A CHRONIC KIDNEY DISEASE: Chronic | ICD-10-CM

## 2023-06-10 LAB
25(OH)D3+25(OH)D2 SERPL-MCNC: 54 NG/ML (ref 30–96)
ALBUMIN SERPL BCP-MCNC: 3.7 G/DL (ref 3.5–5.2)
ALP SERPL-CCNC: 70 U/L (ref 55–135)
ALT SERPL W/O P-5'-P-CCNC: 14 U/L (ref 10–44)
ANION GAP SERPL CALC-SCNC: 13 MMOL/L (ref 8–16)
AST SERPL-CCNC: 24 U/L (ref 10–40)
BASOPHILS # BLD AUTO: 0.03 K/UL (ref 0–0.2)
BASOPHILS NFR BLD: 0.5 % (ref 0–1.9)
BILIRUB SERPL-MCNC: 0.8 MG/DL (ref 0.1–1)
BUN SERPL-MCNC: 24 MG/DL (ref 8–23)
CALCIUM SERPL-MCNC: 10.6 MG/DL (ref 8.7–10.5)
CHLORIDE SERPL-SCNC: 104 MMOL/L (ref 95–110)
CHOLEST SERPL-MCNC: 157 MG/DL (ref 120–199)
CHOLEST/HDLC SERPL: 3 {RATIO} (ref 2–5)
CO2 SERPL-SCNC: 26 MMOL/L (ref 23–29)
CREAT SERPL-MCNC: 1 MG/DL (ref 0.5–1.4)
DIFFERENTIAL METHOD: ABNORMAL
EOSINOPHIL # BLD AUTO: 0.1 K/UL (ref 0–0.5)
EOSINOPHIL NFR BLD: 2.3 % (ref 0–8)
ERYTHROCYTE [DISTWIDTH] IN BLOOD BY AUTOMATED COUNT: 15.8 % (ref 11.5–14.5)
EST. GFR  (NO RACE VARIABLE): 54.5 ML/MIN/1.73 M^2
GLUCOSE SERPL-MCNC: 83 MG/DL (ref 70–110)
HCT VFR BLD AUTO: 46 % (ref 37–48.5)
HDLC SERPL-MCNC: 52 MG/DL (ref 40–75)
HDLC SERPL: 33.1 % (ref 20–50)
HGB BLD-MCNC: 14.1 G/DL (ref 12–16)
IMM GRANULOCYTES # BLD AUTO: 0.01 K/UL (ref 0–0.04)
IMM GRANULOCYTES NFR BLD AUTO: 0.2 % (ref 0–0.5)
LDLC SERPL CALC-MCNC: 81.2 MG/DL (ref 63–159)
LYMPHOCYTES # BLD AUTO: 2.5 K/UL (ref 1–4.8)
LYMPHOCYTES NFR BLD: 40.4 % (ref 18–48)
MCH RBC QN AUTO: 28.8 PG (ref 27–31)
MCHC RBC AUTO-ENTMCNC: 30.7 G/DL (ref 32–36)
MCV RBC AUTO: 94 FL (ref 82–98)
MONOCYTES # BLD AUTO: 0.6 K/UL (ref 0.3–1)
MONOCYTES NFR BLD: 10.1 % (ref 4–15)
NEUTROPHILS # BLD AUTO: 2.9 K/UL (ref 1.8–7.7)
NEUTROPHILS NFR BLD: 46.5 % (ref 38–73)
NONHDLC SERPL-MCNC: 105 MG/DL
NRBC BLD-RTO: 0 /100 WBC
PLATELET # BLD AUTO: 247 K/UL (ref 150–450)
PMV BLD AUTO: 11 FL (ref 9.2–12.9)
POTASSIUM SERPL-SCNC: 4.5 MMOL/L (ref 3.5–5.1)
PROT SERPL-MCNC: 7.4 G/DL (ref 6–8.4)
PTH-INTACT SERPL-MCNC: 31 PG/ML (ref 9–77)
RBC # BLD AUTO: 4.9 M/UL (ref 4–5.4)
SODIUM SERPL-SCNC: 143 MMOL/L (ref 136–145)
TRIGL SERPL-MCNC: 119 MG/DL (ref 30–150)
TSH SERPL DL<=0.005 MIU/L-ACNC: 3.48 UIU/ML (ref 0.4–4)
WBC # BLD AUTO: 6.22 K/UL (ref 3.9–12.7)

## 2023-06-10 PROCEDURE — 36415 COLL VENOUS BLD VENIPUNCTURE: CPT | Mod: PO | Performed by: FAMILY MEDICINE

## 2023-06-10 PROCEDURE — 80053 COMPREHEN METABOLIC PANEL: CPT | Performed by: FAMILY MEDICINE

## 2023-06-10 PROCEDURE — 83970 ASSAY OF PARATHORMONE: CPT | Performed by: FAMILY MEDICINE

## 2023-06-10 PROCEDURE — 84443 ASSAY THYROID STIM HORMONE: CPT | Performed by: FAMILY MEDICINE

## 2023-06-10 PROCEDURE — 82306 VITAMIN D 25 HYDROXY: CPT | Performed by: FAMILY MEDICINE

## 2023-06-10 PROCEDURE — 80061 LIPID PANEL: CPT | Performed by: FAMILY MEDICINE

## 2023-06-10 PROCEDURE — 85025 COMPLETE CBC W/AUTO DIFF WBC: CPT | Performed by: FAMILY MEDICINE

## 2023-06-19 ENCOUNTER — HOSPITAL ENCOUNTER (OUTPATIENT)
Dept: RADIOLOGY | Facility: HOSPITAL | Age: 88
Discharge: HOME | End: 2023-06-19
Attending: FAMILY MEDICINE
Payer: MEDICARE

## 2023-06-19 ENCOUNTER — OFFICE VISIT (OUTPATIENT)
Dept: FAMILY MEDICINE | Facility: CLINIC | Age: 88
End: 2023-06-19
Payer: MEDICARE

## 2023-06-19 VITALS
BODY MASS INDEX: 29.48 KG/M2 | HEART RATE: 58 BPM | WEIGHT: 150.13 LBS | SYSTOLIC BLOOD PRESSURE: 172 MMHG | TEMPERATURE: 98 F | HEIGHT: 60 IN | DIASTOLIC BLOOD PRESSURE: 84 MMHG | OXYGEN SATURATION: 97 %

## 2023-06-19 DIAGNOSIS — Z78.0 POSTMENOPAUSAL: ICD-10-CM

## 2023-06-19 DIAGNOSIS — R05.9 COUGH, UNSPECIFIED TYPE: ICD-10-CM

## 2023-06-19 DIAGNOSIS — E83.52 HYPERCALCEMIA: ICD-10-CM

## 2023-06-19 DIAGNOSIS — I10 ESSENTIAL HYPERTENSION, BENIGN: Chronic | ICD-10-CM

## 2023-06-19 DIAGNOSIS — N18.31 STAGE 3A CHRONIC KIDNEY DISEASE: Chronic | ICD-10-CM

## 2023-06-19 DIAGNOSIS — M85.80 OSTEOPENIA WITH HIGH RISK OF FRACTURE: ICD-10-CM

## 2023-06-19 DIAGNOSIS — Z00.00 ENCOUNTER FOR PREVENTIVE HEALTH EXAMINATION: Primary | ICD-10-CM

## 2023-06-19 PROCEDURE — 1126F AMNT PAIN NOTED NONE PRSNT: CPT | Mod: CPTII,S$GLB,, | Performed by: FAMILY MEDICINE

## 2023-06-19 PROCEDURE — 1101F PT FALLS ASSESS-DOCD LE1/YR: CPT | Mod: CPTII,S$GLB,, | Performed by: FAMILY MEDICINE

## 2023-06-19 PROCEDURE — 1101F PR PT FALLS ASSESS DOC 0-1 FALLS W/OUT INJ PAST YR: ICD-10-PCS | Mod: CPTII,S$GLB,, | Performed by: FAMILY MEDICINE

## 2023-06-19 PROCEDURE — 99397 PR PREVENTIVE VISIT,EST,65 & OVER: ICD-10-PCS | Mod: S$GLB,,, | Performed by: FAMILY MEDICINE

## 2023-06-19 PROCEDURE — 3288F FALL RISK ASSESSMENT DOCD: CPT | Mod: CPTII,S$GLB,, | Performed by: FAMILY MEDICINE

## 2023-06-19 PROCEDURE — 1159F MED LIST DOCD IN RCRD: CPT | Mod: CPTII,S$GLB,, | Performed by: FAMILY MEDICINE

## 2023-06-19 PROCEDURE — 99397 PER PM REEVAL EST PAT 65+ YR: CPT | Mod: S$GLB,,, | Performed by: FAMILY MEDICINE

## 2023-06-19 PROCEDURE — 82570 ASSAY OF URINE CREATININE: CPT | Performed by: FAMILY MEDICINE

## 2023-06-19 PROCEDURE — 71046 XR CHEST PA AND LATERAL: ICD-10-PCS | Mod: 26,,, | Performed by: RADIOLOGY

## 2023-06-19 PROCEDURE — 99999 PR PBB SHADOW E&M-EST. PATIENT-LVL V: ICD-10-PCS | Mod: PBBFAC,,, | Performed by: FAMILY MEDICINE

## 2023-06-19 PROCEDURE — 1160F RVW MEDS BY RX/DR IN RCRD: CPT | Mod: CPTII,S$GLB,, | Performed by: FAMILY MEDICINE

## 2023-06-19 PROCEDURE — 1126F PR PAIN SEVERITY QUANTIFIED, NO PAIN PRESENT: ICD-10-PCS | Mod: CPTII,S$GLB,, | Performed by: FAMILY MEDICINE

## 2023-06-19 PROCEDURE — 99999 PR PBB SHADOW E&M-EST. PATIENT-LVL V: CPT | Mod: PBBFAC,,, | Performed by: FAMILY MEDICINE

## 2023-06-19 PROCEDURE — 1160F PR REVIEW ALL MEDS BY PRESCRIBER/CLIN PHARMACIST DOCUMENTED: ICD-10-PCS | Mod: CPTII,S$GLB,, | Performed by: FAMILY MEDICINE

## 2023-06-19 PROCEDURE — 3288F PR FALLS RISK ASSESSMENT DOCUMENTED: ICD-10-PCS | Mod: CPTII,S$GLB,, | Performed by: FAMILY MEDICINE

## 2023-06-19 PROCEDURE — 71046 X-RAY EXAM CHEST 2 VIEWS: CPT | Mod: TC,FY,PO

## 2023-06-19 PROCEDURE — 1159F PR MEDICATION LIST DOCUMENTED IN MEDICAL RECORD: ICD-10-PCS | Mod: CPTII,S$GLB,, | Performed by: FAMILY MEDICINE

## 2023-06-19 PROCEDURE — 71046 X-RAY EXAM CHEST 2 VIEWS: CPT | Mod: 26,,, | Performed by: RADIOLOGY

## 2023-06-19 RX ORDER — LEVOCETIRIZINE DIHYDROCHLORIDE 5 MG/1
5 TABLET, FILM COATED ORAL NIGHTLY
Qty: 30 TABLET | Refills: 11 | Status: SHIPPED | OUTPATIENT
Start: 2023-06-19

## 2023-06-19 RX ORDER — HYDRALAZINE HYDROCHLORIDE 100 MG/1
100 TABLET, FILM COATED ORAL 2 TIMES DAILY
Qty: 60 TABLET | Refills: 1 | Status: SHIPPED | OUTPATIENT
Start: 2023-06-19 | End: 2023-07-11 | Stop reason: SDUPTHER

## 2023-06-19 NOTE — PATIENT INSTRUCTIONS
No tomes el suplemento de Calcio-Vitamina D por ahora hasta que le avise    Después del desayuno  Hydralazine 100 mg  Valsartán-HCTZ 160-12.5 mg  Nebivolol 10mg  Aspirina 81 mg    despues de la maru  Hydralazine 100 mg

## 2023-06-22 ENCOUNTER — HOSPITAL ENCOUNTER (OUTPATIENT)
Dept: RADIOLOGY | Facility: CLINIC | Age: 88
Discharge: HOME | End: 2023-06-22
Attending: FAMILY MEDICINE
Payer: MEDICARE

## 2023-06-22 ENCOUNTER — PATIENT MESSAGE (OUTPATIENT)
Dept: FAMILY MEDICINE | Facility: CLINIC | Age: 88
End: 2023-06-22
Payer: MEDICARE

## 2023-06-22 DIAGNOSIS — M85.80 OSTEOPENIA WITH HIGH RISK OF FRACTURE: ICD-10-CM

## 2023-06-22 DIAGNOSIS — Z78.0 POSTMENOPAUSAL: ICD-10-CM

## 2023-06-22 PROCEDURE — 77080 DXA BONE DENSITY AXIAL: CPT | Mod: 26,HCNC,, | Performed by: INTERNAL MEDICINE

## 2023-06-22 PROCEDURE — 77080 DXA BONE DENSITY AXIAL: CPT | Mod: TC,PO

## 2023-06-22 PROCEDURE — 77080 DXA BONE DENSITY AXIAL SKELETON 1 OR MORE SITES: ICD-10-PCS | Mod: 26,HCNC,, | Performed by: INTERNAL MEDICINE

## 2023-06-27 PROBLEM — M85.80 OSTEOPENIA WITH HIGH RISK OF FRACTURE: Status: ACTIVE | Noted: 2021-03-29

## 2023-06-27 PROBLEM — E21.3 HYPERPARATHYROIDISM: Status: RESOLVED | Noted: 2022-12-18 | Resolved: 2023-06-27

## 2023-06-27 PROBLEM — R05.9 COUGH: Status: ACTIVE | Noted: 2023-06-27

## 2023-06-27 NOTE — ASSESSMENT & PLAN NOTE
Recheck bone density.  Given recent hypercalcemia, advised holding calcium and taking Vitamin D supplement alone.

## 2023-06-27 NOTE — ASSESSMENT & PLAN NOTE
BP Readings from Last 3 Encounters:   06/19/23 (!) 172/84   12/12/22 118/74   06/09/22 (!) 147/67        ACC/AHA guidelines on blood pressure goals reviewed.  Reinforced correct way of measuring blood pressure.  BP above goal.  Increase Hydralazine to 100 mg BID  Continue Valsartan-HCTZ 160 mg-12.5 mg twice daily, and Nebivolol 10 mg twice daily  Recheck BP in 2-4 weeks.

## 2023-06-27 NOTE — PROGRESS NOTES
Patient Name: Debbie Earl    : 1935  MRN: [id number]      Subjective:     Patient ID: Debbie is a 87 y.o. female    Chief Complaint:  Annual Exam, Cough, Hypertension, and Chronic Kidney Disease    87 year old female comes in for annual exam.    Hypertension  This is a chronic problem. The current episode started more than 1 year ago. The problem is uncontrolled. Pertinent negatives include no anxiety, blurred vision, chest pain, headaches, neck pain, palpitations, peripheral edema, PND, shortness of breath or sweats. Risk factors for coronary artery disease include post-menopausal state. Past treatments include direct vasodilators, diuretics, angiotensin blockers and beta blockers. The current treatment provides moderate improvement. Hypertensive end-organ damage includes kidney disease.   Cough  This is a new problem. The current episode started in the past 7 days. The problem has been waxing and waning. The cough is Non-productive. Associated symptoms include ear congestion and postnasal drip. Pertinent negatives include no chest pain, chills, ear pain, fever, headaches, heartburn, nasal congestion, rhinorrhea, sore throat, shortness of breath, sweats or wheezing. Nothing aggravates the symptoms.      Review of Systems   Constitutional:  Negative for activity change, chills, fever and unexpected weight change.   HENT:  Positive for postnasal drip. Negative for ear pain, hearing loss, rhinorrhea, sore throat and trouble swallowing.    Eyes:  Negative for blurred vision, discharge and visual disturbance.   Respiratory:  Positive for cough. Negative for chest tightness, shortness of breath and wheezing.    Cardiovascular:  Negative for chest pain, palpitations and PND.   Gastrointestinal:  Negative for blood in stool, constipation, diarrhea, heartburn and vomiting.   Endocrine: Negative for polydipsia and polyuria.   Genitourinary:  Negative for difficulty urinating, dysuria, hematuria and menstrual problem.    Musculoskeletal:  Positive for arthralgias. Negative for joint swelling and neck pain.   Neurological:  Negative for weakness and headaches.   Psychiatric/Behavioral:  Negative for confusion and dysphoric mood.       Objective:   BP (!) 172/84 (BP Location: Right arm, Patient Position: Sitting, BP Method: Large (Manual))   Pulse (!) 58   Temp 97.7 °F (36.5 °C) (Oral)   Ht 5' (1.524 m)   Wt 68.1 kg (150 lb 2.1 oz)   SpO2 97%   BMI 29.32 kg/m²     Physical Exam  Vitals reviewed.   Constitutional:       General: She is not in acute distress.  HENT:      Head: Normocephalic and atraumatic.      Right Ear: Ear canal and external ear normal. A middle ear effusion is present. Tympanic membrane is not erythematous.      Left Ear: Ear canal and external ear normal. A middle ear effusion is present. Tympanic membrane is not erythematous.      Nose: Nose normal.      Mouth/Throat:      Mouth: Mucous membranes are moist.      Pharynx: No oropharyngeal exudate or posterior oropharyngeal erythema.   Eyes:      Extraocular Movements: Extraocular movements intact.      Conjunctiva/sclera: Conjunctivae normal.      Pupils: Pupils are equal, round, and reactive to light.   Cardiovascular:      Rate and Rhythm: Normal rate and regular rhythm.      Pulses: Normal pulses.      Heart sounds: Normal heart sounds.   Pulmonary:      Effort: Pulmonary effort is normal. No respiratory distress.      Breath sounds: No wheezing or rales.   Abdominal:      General: Abdomen is flat. Bowel sounds are normal. There is no distension.      Palpations: Abdomen is soft.      Tenderness: There is no abdominal tenderness. There is no guarding.   Musculoskeletal:      Cervical back: Normal range of motion. No rigidity or tenderness.   Lymphadenopathy:      Cervical: No cervical adenopathy.   Skin:     General: Skin is warm.      Capillary Refill: Capillary refill takes less than 2 seconds.   Neurological:      Mental Status: She is alert and  oriented to person, place, and time.      Cranial Nerves: No cranial nerve deficit.      Sensory: No sensory deficit.   Psychiatric:         Mood and Affect: Mood normal.         Behavior: Behavior normal.        Lab Visit on 06/19/2023   Component Date Value Ref Range Status    Calcium 06/19/2023 10.5  8.7 - 10.5 mg/dL Final   Lab Visit on 06/10/2023   Component Date Value Ref Range Status    Protein, Urine Random 06/19/2023 <7  mg/dL Final    Creatinine, Urine 06/19/2023 102.9  15.0 - 325.0 mg/dL Final    Prot/Creat Ratio, Urine 06/19/2023 Unable to calculate  0.00 - 0.20 Final   Lab Visit on 06/10/2023   Component Date Value Ref Range Status    Sodium 06/10/2023 143  136 - 145 mmol/L Final    Potassium 06/10/2023 4.5  3.5 - 5.1 mmol/L Final    Chloride 06/10/2023 104  95 - 110 mmol/L Final    CO2 06/10/2023 26  23 - 29 mmol/L Final    Glucose 06/10/2023 83  70 - 110 mg/dL Final    BUN 06/10/2023 24 (H)  8 - 23 mg/dL Final    Creatinine 06/10/2023 1.0  0.5 - 1.4 mg/dL Final    Calcium 06/10/2023 10.6 (H)  8.7 - 10.5 mg/dL Final    Total Protein 06/10/2023 7.4  6.0 - 8.4 g/dL Final    Albumin 06/10/2023 3.7  3.5 - 5.2 g/dL Final    Total Bilirubin 06/10/2023 0.8  0.1 - 1.0 mg/dL Final    Comment: For infants and newborns, interpretation of results should be based  on gestational age, weight and in agreement with clinical  observations.    Premature Infant recommended reference ranges:  Up to 24 hours.............<8.0 mg/dL  Up to 48 hours............<12.0 mg/dL  3-5 days..................<15.0 mg/dL  6-29 days.................<15.0 mg/dL      Alkaline Phosphatase 06/10/2023 70  55 - 135 U/L Final    AST 06/10/2023 24  10 - 40 U/L Final    ALT 06/10/2023 14  10 - 44 U/L Final    Anion Gap 06/10/2023 13  8 - 16 mmol/L Final    eGFR 06/10/2023 54.5 (A)  >60 mL/min/1.73 m^2 Final    WBC 06/10/2023 6.22  3.90 - 12.70 K/uL Final    RBC 06/10/2023 4.90  4.00 - 5.40 M/uL Final    Hemoglobin 06/10/2023 14.1  12.0 - 16.0  g/dL Final    Hematocrit 06/10/2023 46.0  37.0 - 48.5 % Final    MCV 06/10/2023 94  82 - 98 fL Final    MCH 06/10/2023 28.8  27.0 - 31.0 pg Final    MCHC 06/10/2023 30.7 (L)  32.0 - 36.0 g/dL Final    RDW 06/10/2023 15.8 (H)  11.5 - 14.5 % Final    Platelets 06/10/2023 247  150 - 450 K/uL Final    MPV 06/10/2023 11.0  9.2 - 12.9 fL Final    Immature Granulocytes 06/10/2023 0.2  0.0 - 0.5 % Final    Gran # (ANC) 06/10/2023 2.9  1.8 - 7.7 K/uL Final    Immature Grans (Abs) 06/10/2023 0.01  0.00 - 0.04 K/uL Final    Comment: Mild elevation in immature granulocytes is non specific and   can be seen in a variety of conditions including stress response,   acute inflammation, trauma and pregnancy. Correlation with other   laboratory and clinical findings is essential.      Lymph # 06/10/2023 2.5  1.0 - 4.8 K/uL Final    Mono # 06/10/2023 0.6  0.3 - 1.0 K/uL Final    Eos # 06/10/2023 0.1  0.0 - 0.5 K/uL Final    Baso # 06/10/2023 0.03  0.00 - 0.20 K/uL Final    nRBC 06/10/2023 0  0 /100 WBC Final    Gran % 06/10/2023 46.5  38.0 - 73.0 % Final    Lymph % 06/10/2023 40.4  18.0 - 48.0 % Final    Mono % 06/10/2023 10.1  4.0 - 15.0 % Final    Eosinophil % 06/10/2023 2.3  0.0 - 8.0 % Final    Basophil % 06/10/2023 0.5  0.0 - 1.9 % Final    Differential Method 06/10/2023 Automated   Final    Cholesterol 06/10/2023 157  120 - 199 mg/dL Final    Comment: The National Cholesterol Education Program (NCEP) has set the  following guidelines (reference ranges) for Cholesterol:  Optimal.....................<200 mg/dL  Borderline High.............200-239 mg/dL  High........................> or = 240 mg/dL      Triglycerides 06/10/2023 119  30 - 150 mg/dL Final    Comment: The National Cholesterol Education Program (NCEP) has set the  following guidelines (reference values) for triglycerides:  Normal......................<150 mg/dL  Borderline High.............150-199 mg/dL  High........................200-499 mg/dL      HDL 06/10/2023 52   40 - 75 mg/dL Final    Comment: The National Cholesterol Education Program (NCEP) has set the  following guidelines (reference values) for HDL Cholesterol:  Low...............<40 mg/dL  Optimal...........>60 mg/dL      LDL Cholesterol 06/10/2023 81.2  63.0 - 159.0 mg/dL Final    Comment: The National Cholesterol Education Program (NCEP) has set the  following guidelines (reference values) for LDL Cholesterol:  Optimal.......................<130 mg/dL  Borderline High...............130-159 mg/dL  High..........................160-189 mg/dL  Very High.....................>190 mg/dL      HDL/Cholesterol Ratio 06/10/2023 33.1  20.0 - 50.0 % Final    Total Cholesterol/HDL Ratio 06/10/2023 3.0  2.0 - 5.0 Final    Non-HDL Cholesterol 06/10/2023 105  mg/dL Final    Comment: Risk category and Non-HDL cholesterol goals:  Coronary heart disease (CHD)or equivalent (10-year risk of CHD >20%):  Non-HDL cholesterol goal     <130 mg/dL  Two or more CHD risk factors and 10-year risk of CHD <= 20%:  Non-HDL cholesterol goal     <160 mg/dL  0 to 1 CHD risk factor:  Non-HDL cholesterol goal     <190 mg/dL      PTH, Intact 06/10/2023 31.0  9.0 - 77.0 pg/mL Final    Vit D, 25-Hydroxy 06/10/2023 54  30 - 96 ng/mL Final    Comment: Vitamin D deficiency.........<10 ng/mL                              Vitamin D insufficiency......10-29 ng/mL       Vitamin D sufficiency........> or equal to 30 ng/mL  Vitamin D toxicity............>100 ng/mL      TSH 06/10/2023 3.475  0.400 - 4.000 uIU/mL Final       Assessment        ICD-10-CM ICD-9-CM   1. Encounter for preventive health examination  Z00.00 V70.0   2. Osteopenia with high risk of fracture  M85.80 733.90   3. Postmenopausal  Z78.0 V49.81   4. Hypercalcemia  E83.52 275.42   5. Cough, unspecified type  R05.9 786.2   6. Essential hypertension, benign  I10 401.1   7. Stage 3a chronic kidney disease  N18.31 585.3         Plan:     1. Encounter for preventive health examination  Age appropriate  screenings, vaccinations, and recommendations reviewed and discussed.  Appropriate orders placed and previous results reviewed.    2. Osteopenia with high risk of fracture/  3. Postmenopausal  Overview:  02-  DEXA  Lumbar spine (L1-L4):        BMD is 0.947 g/cm2, T-score is -0.9, and Z-score is .  Total hip:                                BMD is 0.851 g/cm2, T-score is -0.7, and Z-score is .  Femoral neck:                         BMD is 0.600 g/cm2, T-score is -2.2, and Z-score is .     FRAX:  21% risk of a major osteoporotic fracture in the next 10 years.  14% risk of hip fracture in the next 10 years.    Assessment & Plan:  Recheck bone density.  Given recent hypercalcemia, advised holding calcium and taking Vitamin D supplement alone.     Orders:  -     DXA Bone Density Axial Skeleton 1 or more sites; Future; Expected date: 06/19/2023    4. Hypercalcemia  -     CALCIUM; Future; Expected date: 06/19/2023  Recheck calcium level    5. Cough, unspecified type  Assessment & Plan:  Check CXR  Trial of Xyzal.     Orders:  -     X-Ray Chest PA And Lateral; Future; Expected date: 06/19/2023  -     levocetirizine (XYZAL) 5 MG tablet; Take 1 tablet (5 mg total) by mouth every evening.  Dispense: 30 tablet; Refill: 11    6. Essential hypertension, benign  Assessment & Plan:  BP Readings from Last 3 Encounters:   06/19/23 (!) 172/84   12/12/22 118/74   06/09/22 (!) 147/67        ACC/AHA guidelines on blood pressure goals reviewed.  Reinforced correct way of measuring blood pressure.  BP above goal.  Increase Hydralazine to 100 mg BID  Continue Valsartan-HCTZ 160 mg-12.5 mg twice daily, and Nebivolol 10 mg twice daily  Recheck BP in 2-4 weeks.     Orders:  -     hydrALAZINE (APRESOLINE) 100 MG tablet; Take 1 tablet (100 mg total) by mouth 2 (two) times daily.  Dispense: 60 tablet; Refill: 1    7. Stage 3a chronic kidney disease  Overview:  Lab Results   Component Value Date    EGFRNORACEVR 54.5 (A) 06/10/2023     EGFRNORACEVR 44 (A) 12/09/2022        Assessment & Plan:  Advised on staying well hydrated.  Avoid nephrotoxic agents including NSAIDs.              -Vasiliy Oneill Jr., MD, AAHIVS      This office note has been dictated.  This dictation has been generated using M-Modal Fluency Direct dictation; some phonetic errors may occur.         Patient Instructions   No tomes el suplemento de Calcio-Vitamina D por ahora hasta que le avise    Después del desayuno  Hydralazine 100 mg  Valsartán-HCTZ 160-12.5 mg  Nebivolol 10mg  Aspirina 81 mg    despues de la maru  Hydralazine 100 mg    English  No tomes el suplemento de Calcio-Vitamina D por ahora hasta que te avise    Después del desayuno  Hidralazina 100 mg  Valsartán-HCTZ 160-12.5 mg  Nebivolol 10mg  Aspirina 81 mg    despues de la maru  Hidralazina 100 mg    Future Appointments   Date Time Provider Department Center   7/11/2023  9:20 AM Vasiliy Oneill Jr., MD [facility]

## 2023-06-28 ENCOUNTER — TELEPHONE (OUTPATIENT)
Dept: FAMILY MEDICINE | Facility: CLINIC | Age: 88
End: 2023-06-28
Payer: MEDICARE

## 2023-06-28 NOTE — TELEPHONE ENCOUNTER
----- Message from Vasiliy Oneill Jr., MD sent at 6/22/2023  4:54 AM CDT -----  Please let daughter know that chest xray showed some increased swelling in area around air sacs. This could be due to a recent respiratory infection. Should keep appt with me in 2 weeks to reevaluate. If any worsening of cough or shortness of breath, should go to ER or urgent care.

## 2023-07-11 ENCOUNTER — OFFICE VISIT (OUTPATIENT)
Dept: FAMILY MEDICINE | Facility: CLINIC | Age: 88
End: 2023-07-11
Payer: MEDICARE

## 2023-07-11 VITALS
TEMPERATURE: 98 F | HEART RATE: 59 BPM | BODY MASS INDEX: 30.09 KG/M2 | HEIGHT: 60 IN | DIASTOLIC BLOOD PRESSURE: 62 MMHG | WEIGHT: 153.25 LBS | SYSTOLIC BLOOD PRESSURE: 138 MMHG | OXYGEN SATURATION: 93 %

## 2023-07-11 DIAGNOSIS — M85.80 OSTEOPENIA WITH HIGH RISK OF FRACTURE: Chronic | ICD-10-CM

## 2023-07-11 DIAGNOSIS — N18.31 STAGE 3A CHRONIC KIDNEY DISEASE: Chronic | ICD-10-CM

## 2023-07-11 DIAGNOSIS — I10 ESSENTIAL HYPERTENSION, BENIGN: Primary | Chronic | ICD-10-CM

## 2023-07-11 PROCEDURE — 1126F PR PAIN SEVERITY QUANTIFIED, NO PAIN PRESENT: ICD-10-PCS | Mod: HCNC,CPTII,S$GLB, | Performed by: FAMILY MEDICINE

## 2023-07-11 PROCEDURE — 3288F FALL RISK ASSESSMENT DOCD: CPT | Mod: HCNC,CPTII,S$GLB, | Performed by: FAMILY MEDICINE

## 2023-07-11 PROCEDURE — 1160F PR REVIEW ALL MEDS BY PRESCRIBER/CLIN PHARMACIST DOCUMENTED: ICD-10-PCS | Mod: HCNC,CPTII,S$GLB, | Performed by: FAMILY MEDICINE

## 2023-07-11 PROCEDURE — 1101F PT FALLS ASSESS-DOCD LE1/YR: CPT | Mod: HCNC,CPTII,S$GLB, | Performed by: FAMILY MEDICINE

## 2023-07-11 PROCEDURE — 1159F PR MEDICATION LIST DOCUMENTED IN MEDICAL RECORD: ICD-10-PCS | Mod: HCNC,CPTII,S$GLB, | Performed by: FAMILY MEDICINE

## 2023-07-11 PROCEDURE — 99999 PR PBB SHADOW E&M-EST. PATIENT-LVL III: CPT | Mod: PBBFAC,HCNC,, | Performed by: FAMILY MEDICINE

## 2023-07-11 PROCEDURE — 99214 PR OFFICE/OUTPT VISIT, EST, LEVL IV, 30-39 MIN: ICD-10-PCS | Mod: HCNC,S$GLB,, | Performed by: FAMILY MEDICINE

## 2023-07-11 PROCEDURE — 1159F MED LIST DOCD IN RCRD: CPT | Mod: HCNC,CPTII,S$GLB, | Performed by: FAMILY MEDICINE

## 2023-07-11 PROCEDURE — 99214 OFFICE O/P EST MOD 30 MIN: CPT | Mod: HCNC,S$GLB,, | Performed by: FAMILY MEDICINE

## 2023-07-11 PROCEDURE — 1101F PR PT FALLS ASSESS DOC 0-1 FALLS W/OUT INJ PAST YR: ICD-10-PCS | Mod: HCNC,CPTII,S$GLB, | Performed by: FAMILY MEDICINE

## 2023-07-11 PROCEDURE — 3288F PR FALLS RISK ASSESSMENT DOCUMENTED: ICD-10-PCS | Mod: HCNC,CPTII,S$GLB, | Performed by: FAMILY MEDICINE

## 2023-07-11 PROCEDURE — 99999 PR PBB SHADOW E&M-EST. PATIENT-LVL III: ICD-10-PCS | Mod: PBBFAC,HCNC,, | Performed by: FAMILY MEDICINE

## 2023-07-11 PROCEDURE — 1126F AMNT PAIN NOTED NONE PRSNT: CPT | Mod: HCNC,CPTII,S$GLB, | Performed by: FAMILY MEDICINE

## 2023-07-11 PROCEDURE — 1160F RVW MEDS BY RX/DR IN RCRD: CPT | Mod: HCNC,CPTII,S$GLB, | Performed by: FAMILY MEDICINE

## 2023-07-11 RX ORDER — HYDRALAZINE HYDROCHLORIDE 100 MG/1
100 TABLET, FILM COATED ORAL 2 TIMES DAILY
Qty: 180 TABLET | Refills: 2 | Status: SHIPPED | OUTPATIENT
Start: 2023-07-11 | End: 2023-11-14 | Stop reason: SDUPTHER

## 2023-07-12 ENCOUNTER — PES CALL (OUTPATIENT)
Dept: ADMINISTRATIVE | Facility: CLINIC | Age: 88
End: 2023-07-12
Payer: MEDICARE

## 2023-07-12 NOTE — ASSESSMENT & PLAN NOTE
Give osteopenia with high fracture risk, will treat as osteoporosis, and chronic kidney disease will start patient on Prolia. Advise continue vitamin D supplementation.

## 2023-07-12 NOTE — ASSESSMENT & PLAN NOTE
Blood pressure is better. Continue hydralazine 100 mg twice a day, bystolic 10 mg daily, and valsartan Dash hydrochlorothiazide 160-12.5 mg daily.

## 2023-07-12 NOTE — PROGRESS NOTES
Patient Name: Debbie Earl    : 1935  MRN: [id number]      Subjective:     Patient ID: Debbie is a 87 y.o. female    Chief Complaint:  Hypertension    Hypertension  This is a chronic problem. The current episode started more than 1 year ago. The problem is controlled. Pertinent negatives include no anxiety, blurred vision, chest pain, headaches, malaise/fatigue, neck pain, orthopnea, palpitations, peripheral edema, PND, shortness of breath or sweats. Risk factors for coronary artery disease include post-menopausal state and sedentary lifestyle. Past treatments include diuretics, direct vasodilators, angiotensin blockers and beta blockers. The current treatment provides significant improvement. There are no compliance problems.  Hypertensive end-organ damage includes kidney disease.      Review of Systems   Constitutional:  Negative for malaise/fatigue.   Eyes:  Negative for blurred vision.   Respiratory:  Negative for shortness of breath.    Cardiovascular:  Negative for chest pain, palpitations, orthopnea and PND.   Musculoskeletal:  Negative for neck pain.   Neurological:  Negative for headaches.      Objective:   /62 (BP Location: Left arm, Patient Position: Sitting, BP Method: Medium (Manual))   Pulse (!) 59   Temp 97.8 °F (36.6 °C) (Oral)   Ht 5' (1.524 m)   Wt 69.5 kg (153 lb 3.5 oz)   SpO2 (!) 93%   BMI 29.92 kg/m²     Physical Exam  Vitals reviewed.   Constitutional:       General: She is not in acute distress.  HENT:      Head: Normocephalic and atraumatic.      Right Ear: Ear canal and external ear normal.      Left Ear: Ear canal and external ear normal.      Nose: Nose normal.      Mouth/Throat:      Mouth: Mucous membranes are moist.      Pharynx: No oropharyngeal exudate or posterior oropharyngeal erythema.   Eyes:      Extraocular Movements: Extraocular movements intact.      Conjunctiva/sclera: Conjunctivae normal.      Pupils: Pupils are equal, round, and reactive to light.    Cardiovascular:      Rate and Rhythm: Normal rate and regular rhythm.   Pulmonary:      Effort: Pulmonary effort is normal. No respiratory distress.      Breath sounds: No wheezing, rhonchi or rales.   Abdominal:      General: Abdomen is flat. Bowel sounds are normal. There is no distension.      Palpations: Abdomen is soft.      Tenderness: There is no abdominal tenderness. There is no guarding.   Musculoskeletal:      Cervical back: Normal range of motion. No rigidity or tenderness.   Lymphadenopathy:      Cervical: No cervical adenopathy.   Skin:     General: Skin is warm.      Capillary Refill: Capillary refill takes less than 2 seconds.   Neurological:      Mental Status: She is alert and oriented to person, place, and time.      Cranial Nerves: No cranial nerve deficit.      Sensory: No sensory deficit.   Psychiatric:         Mood and Affect: Mood normal.         Behavior: Behavior normal.      Lab Visit on 06/19/2023   Component Date Value Ref Range Status    Calcium 06/19/2023 10.5  8.7 - 10.5 mg/dL Final       Assessment        ICD-10-CM ICD-9-CM   1. Essential hypertension, benign  I10 401.1   2. Stage 3a chronic kidney disease  N18.31 585.3   3. Osteopenia with high risk of fracture  M85.80 733.90         Plan:     1. Essential hypertension, benign  Overview:  BP Readings from Last 3 Encounters:   07/11/23 138/62   06/19/23 (!) 172/84   12/12/22 118/74       Assessment & Plan:  Blood pressure is better. Continue hydralazine 100 mg twice a day, bystolic 10 mg daily, and valsartan Dash hydrochlorothiazide 160-12.5 mg daily.     Orders:  -     hydrALAZINE (APRESOLINE) 100 MG tablet; Take 1 tablet (100 mg total) by mouth 2 (two) times daily.  Dispense: 180 tablet; Refill: 2    2. Stage 3a chronic kidney disease  Overview:  Lab Results   Component Value Date    EGFRNORACEVR 54.5 (A) 06/10/2023    EGFRNORACEVR 44 (A) 12/09/2022        Assessment & Plan:  Renal function stable.   Advised good hydration and  avoiding nephrotoxic agents such as NSAIDs.       3. Osteopenia with high risk of fracture  Overview:  DXA Bone Density Axial Skeleton 1 or more sites 06/22/2023  COMPARISON:  Comparison study done on 02/23/2021.   Lumbar spine:      BMD 0.947 g/cm2 and T-score -0.9.   Total Hip:            BMD 0.851 g/cm2 and T-score -0.7.   Distal 1/3 radius:  Not applicable    FINDINGS:  Lumbar spine (L1-L4): BMD is 1.020 g/cm2, T-score is -0.2, and Z-score is .  Total hip:   BMD is 0.870 g/cm2, T-score is -0.6, and Z-score is .  Femoral neck:  BMD is 0.629 g/cm2, T-score is -2.0, and Z-score is .  Distal 1/3 radius:  Not applicable    FRAX:  13% risk of a major osteoporotic fracture in the next 10 years.  3.5% risk of hip fracture in the next 10 years.    Impression  *Osteoporosis based on T-score between -1.0 and -2.5 and elevated risk based on FRAX.  *Fracture risk is high.  *Compared with previous DXA, BMD at the lumbar spine has remained stable, and BMD at the total hip has remained stable.    Assessment & Plan:  Give osteopenia with high fracture risk, will treat as osteoporosis, and chronic kidney disease will start patient on Prolia. Advise continue vitamin D supplementation.       Other orders  -     denosumab (PROLIA) injection 60 mg           -Vasiliy Oneill Jr., MD, AAHIVS      This office note has been dictated.  This dictation has been generated using M-Modal Fluency Direct dictation; some phonetic errors may occur.         There are no Patient Instructions on file for this visit.      Future Appointments   Date Time Provider Department Center   1/11/2024  9:00 AM Vasiliy Oneill Jr., MD [facility]

## 2023-07-31 ENCOUNTER — PATIENT MESSAGE (OUTPATIENT)
Dept: FAMILY MEDICINE | Facility: CLINIC | Age: 88
End: 2023-07-31
Payer: MEDICARE

## 2023-07-31 ENCOUNTER — INFUSION (OUTPATIENT)
Dept: INFUSION THERAPY | Facility: HOSPITAL | Age: 88
End: 2023-07-31
Attending: FAMILY MEDICINE
Payer: MEDICARE

## 2023-07-31 VITALS
SYSTOLIC BLOOD PRESSURE: 150 MMHG | OXYGEN SATURATION: 94 % | RESPIRATION RATE: 16 BRPM | TEMPERATURE: 98 F | HEART RATE: 82 BPM | DIASTOLIC BLOOD PRESSURE: 68 MMHG

## 2023-07-31 DIAGNOSIS — M85.80 OSTEOPENIA WITH HIGH RISK OF FRACTURE: Primary | ICD-10-CM

## 2023-07-31 PROCEDURE — 63600175 PHARM REV CODE 636 W HCPCS: Mod: JZ,JG,HCNC | Performed by: FAMILY MEDICINE

## 2023-07-31 PROCEDURE — 96372 THER/PROPH/DIAG INJ SC/IM: CPT | Mod: HCNC

## 2023-07-31 RX ADMIN — DENOSUMAB 60 MG: 60 INJECTION SUBCUTANEOUS at 09:07

## 2023-07-31 NOTE — PLAN OF CARE
Patient presented to unit for initial Prolia 60mg. VSS. Most recent calcium 10.4. Prolia administered subq on left arm. Patient monitored following  injection. No new or worsening symptoms reported. Discharged from unit in NAD.

## 2023-11-14 DIAGNOSIS — I10 ESSENTIAL HYPERTENSION, BENIGN: Chronic | ICD-10-CM

## 2023-11-14 RX ORDER — HYDRALAZINE HYDROCHLORIDE 100 MG/1
100 TABLET, FILM COATED ORAL 2 TIMES DAILY
Qty: 180 TABLET | Refills: 1 | Status: SHIPPED | OUTPATIENT
Start: 2023-11-14

## 2023-11-14 RX ORDER — DENOSUMAB 60 MG/ML
INJECTION SUBCUTANEOUS
COMMUNITY
Start: 2023-07-31

## 2023-11-14 NOTE — TELEPHONE ENCOUNTER
Refill Decision Note   Debbie Earl  is requesting a refill authorization.  Brief Assessment and Rationale for Refill:  Approve     Medication Therapy Plan:         Comments:     Note composed:12:10 PM 11/14/2023             Appointments     Last Visit   7/11/2023 Vasiliy Oneill Jr., MD   Next Visit   1/11/2024 Vasiliy Oneill Jr., MD

## 2023-11-14 NOTE — TELEPHONE ENCOUNTER
No care due was identified.  [facility] Embedded Care Due Messages. Reference number: [id number].   11/14/2023 11:33:43 AM CST

## 2023-12-07 ENCOUNTER — PATIENT MESSAGE (OUTPATIENT)
Dept: FAMILY MEDICINE | Facility: CLINIC | Age: 88
End: 2023-12-07
Payer: MEDICARE

## 2023-12-07 DIAGNOSIS — N18.31 STAGE 3A CHRONIC KIDNEY DISEASE: Primary | Chronic | ICD-10-CM

## 2023-12-07 DIAGNOSIS — I10 ESSENTIAL HYPERTENSION, BENIGN: Chronic | ICD-10-CM

## 2023-12-07 NOTE — TELEPHONE ENCOUNTER
Patient has started requesting lab scheduling.  Please call patient's daughter and schedule ordered labs to be done no sooner than a week prior to scheduled appointment

## 2024-01-06 ENCOUNTER — LAB VISIT (OUTPATIENT)
Dept: LAB | Facility: HOSPITAL | Age: 89
End: 2024-01-06
Attending: FAMILY MEDICINE
Payer: MEDICARE

## 2024-01-06 DIAGNOSIS — N18.31 STAGE 3A CHRONIC KIDNEY DISEASE: Chronic | ICD-10-CM

## 2024-01-06 DIAGNOSIS — I10 ESSENTIAL HYPERTENSION, BENIGN: Chronic | ICD-10-CM

## 2024-01-06 LAB
ALBUMIN SERPL BCP-MCNC: 3.6 G/DL (ref 3.5–5.2)
ALP SERPL-CCNC: 73 U/L (ref 55–135)
ALT SERPL W/O P-5'-P-CCNC: 13 U/L (ref 10–44)
ANION GAP SERPL CALC-SCNC: 11 MMOL/L (ref 8–16)
AST SERPL-CCNC: 27 U/L (ref 10–40)
BASOPHILS # BLD AUTO: 0.03 K/UL (ref 0–0.2)
BASOPHILS NFR BLD: 0.5 % (ref 0–1.9)
BILIRUB SERPL-MCNC: 0.8 MG/DL (ref 0.1–1)
BUN SERPL-MCNC: 26 MG/DL (ref 8–23)
CALCIUM SERPL-MCNC: 9.9 MG/DL (ref 8.7–10.5)
CHLORIDE SERPL-SCNC: 104 MMOL/L (ref 95–110)
CHOLEST SERPL-MCNC: 158 MG/DL (ref 120–199)
CHOLEST/HDLC SERPL: 3.4 {RATIO} (ref 2–5)
CO2 SERPL-SCNC: 26 MMOL/L (ref 23–29)
CREAT SERPL-MCNC: 1.2 MG/DL (ref 0.5–1.4)
DIFFERENTIAL METHOD BLD: ABNORMAL
EOSINOPHIL # BLD AUTO: 0.1 K/UL (ref 0–0.5)
EOSINOPHIL NFR BLD: 2 % (ref 0–8)
ERYTHROCYTE [DISTWIDTH] IN BLOOD BY AUTOMATED COUNT: 15.5 % (ref 11.5–14.5)
EST. GFR  (NO RACE VARIABLE): 43.5 ML/MIN/1.73 M^2
GLUCOSE SERPL-MCNC: 88 MG/DL (ref 70–110)
HCT VFR BLD AUTO: 42.2 % (ref 37–48.5)
HDLC SERPL-MCNC: 46 MG/DL (ref 40–75)
HDLC SERPL: 29.1 % (ref 20–50)
HGB BLD-MCNC: 13.4 G/DL (ref 12–16)
IMM GRANULOCYTES # BLD AUTO: 0.01 K/UL (ref 0–0.04)
IMM GRANULOCYTES NFR BLD AUTO: 0.2 % (ref 0–0.5)
LDLC SERPL CALC-MCNC: 91.8 MG/DL (ref 63–159)
LYMPHOCYTES # BLD AUTO: 2.1 K/UL (ref 1–4.8)
LYMPHOCYTES NFR BLD: 35 % (ref 18–48)
MCH RBC QN AUTO: 28.4 PG (ref 27–31)
MCHC RBC AUTO-ENTMCNC: 31.8 G/DL (ref 32–36)
MCV RBC AUTO: 89 FL (ref 82–98)
MONOCYTES # BLD AUTO: 0.5 K/UL (ref 0.3–1)
MONOCYTES NFR BLD: 8.8 % (ref 4–15)
NEUTROPHILS # BLD AUTO: 3.2 K/UL (ref 1.8–7.7)
NEUTROPHILS NFR BLD: 53.5 % (ref 38–73)
NONHDLC SERPL-MCNC: 112 MG/DL
NRBC BLD-RTO: 0 /100 WBC
PLATELET # BLD AUTO: 264 K/UL (ref 150–450)
PMV BLD AUTO: 10.9 FL (ref 9.2–12.9)
POTASSIUM SERPL-SCNC: 3.9 MMOL/L (ref 3.5–5.1)
PROT SERPL-MCNC: 7.6 G/DL (ref 6–8.4)
RBC # BLD AUTO: 4.72 M/UL (ref 4–5.4)
SODIUM SERPL-SCNC: 141 MMOL/L (ref 136–145)
TRIGL SERPL-MCNC: 101 MG/DL (ref 30–150)
WBC # BLD AUTO: 5.94 K/UL (ref 3.9–12.7)

## 2024-01-06 PROCEDURE — 80053 COMPREHEN METABOLIC PANEL: CPT | Mod: HCNC | Performed by: FAMILY MEDICINE

## 2024-01-06 PROCEDURE — 36415 COLL VENOUS BLD VENIPUNCTURE: CPT | Mod: HCNC,PO | Performed by: FAMILY MEDICINE

## 2024-01-06 PROCEDURE — 80061 LIPID PANEL: CPT | Mod: HCNC | Performed by: FAMILY MEDICINE

## 2024-01-06 PROCEDURE — 85025 COMPLETE CBC W/AUTO DIFF WBC: CPT | Mod: HCNC | Performed by: FAMILY MEDICINE

## 2024-01-11 ENCOUNTER — LAB VISIT (OUTPATIENT)
Dept: LAB | Facility: HOSPITAL | Age: 89
End: 2024-01-11
Attending: FAMILY MEDICINE
Payer: MEDICARE

## 2024-01-11 ENCOUNTER — OFFICE VISIT (OUTPATIENT)
Dept: FAMILY MEDICINE | Facility: CLINIC | Age: 89
End: 2024-01-11
Payer: MEDICARE

## 2024-01-11 VITALS
HEART RATE: 95 BPM | DIASTOLIC BLOOD PRESSURE: 70 MMHG | HEIGHT: 60 IN | SYSTOLIC BLOOD PRESSURE: 124 MMHG | OXYGEN SATURATION: 95 % | WEIGHT: 144.81 LBS | TEMPERATURE: 99 F | BODY MASS INDEX: 28.43 KG/M2

## 2024-01-11 DIAGNOSIS — I10 ESSENTIAL HYPERTENSION, BENIGN: Primary | Chronic | ICD-10-CM

## 2024-01-11 DIAGNOSIS — N18.31 STAGE 3A CHRONIC KIDNEY DISEASE: Chronic | ICD-10-CM

## 2024-01-11 DIAGNOSIS — F51.04 PSYCHOPHYSIOLOGICAL INSOMNIA: Chronic | ICD-10-CM

## 2024-01-11 DIAGNOSIS — N28.9 ACUTE RENAL IMPAIRMENT: ICD-10-CM

## 2024-01-11 DIAGNOSIS — F41.9 ANXIETY: Chronic | ICD-10-CM

## 2024-01-11 LAB
ALBUMIN SERPL BCP-MCNC: 3.7 G/DL (ref 3.5–5.2)
ANION GAP SERPL CALC-SCNC: 9 MMOL/L (ref 8–16)
BUN SERPL-MCNC: 16 MG/DL (ref 8–23)
CALCIUM SERPL-MCNC: 9.7 MG/DL (ref 8.7–10.5)
CHLORIDE SERPL-SCNC: 102 MMOL/L (ref 95–110)
CO2 SERPL-SCNC: 29 MMOL/L (ref 23–29)
CREAT SERPL-MCNC: 1.1 MG/DL (ref 0.5–1.4)
EST. GFR  (NO RACE VARIABLE): 48 ML/MIN/1.73 M^2
GLUCOSE SERPL-MCNC: 90 MG/DL (ref 70–110)
PHOSPHATE SERPL-MCNC: 2.2 MG/DL (ref 2.7–4.5)
POTASSIUM SERPL-SCNC: 4 MMOL/L (ref 3.5–5.1)
SODIUM SERPL-SCNC: 140 MMOL/L (ref 136–145)

## 2024-01-11 PROCEDURE — 36415 COLL VENOUS BLD VENIPUNCTURE: CPT | Mod: HCNC,PN | Performed by: FAMILY MEDICINE

## 2024-01-11 PROCEDURE — 99999 PR PBB SHADOW E&M-EST. PATIENT-LVL IV: CPT | Mod: PBBFAC,HCNC,, | Performed by: FAMILY MEDICINE

## 2024-01-11 PROCEDURE — 1101F PT FALLS ASSESS-DOCD LE1/YR: CPT | Mod: HCNC,CPTII,S$GLB, | Performed by: FAMILY MEDICINE

## 2024-01-11 PROCEDURE — 1160F RVW MEDS BY RX/DR IN RCRD: CPT | Mod: HCNC,CPTII,S$GLB, | Performed by: FAMILY MEDICINE

## 2024-01-11 PROCEDURE — 1126F AMNT PAIN NOTED NONE PRSNT: CPT | Mod: HCNC,CPTII,S$GLB, | Performed by: FAMILY MEDICINE

## 2024-01-11 PROCEDURE — 3288F FALL RISK ASSESSMENT DOCD: CPT | Mod: HCNC,CPTII,S$GLB, | Performed by: FAMILY MEDICINE

## 2024-01-11 PROCEDURE — 80069 RENAL FUNCTION PANEL: CPT | Mod: HCNC | Performed by: FAMILY MEDICINE

## 2024-01-11 PROCEDURE — 1159F MED LIST DOCD IN RCRD: CPT | Mod: HCNC,CPTII,S$GLB, | Performed by: FAMILY MEDICINE

## 2024-01-11 PROCEDURE — 99214 OFFICE O/P EST MOD 30 MIN: CPT | Mod: HCNC,S$GLB,, | Performed by: FAMILY MEDICINE

## 2024-01-11 RX ORDER — MIRTAZAPINE 7.5 MG/1
7.5 TABLET, FILM COATED ORAL NIGHTLY
Qty: 90 TABLET | Refills: 0 | Status: SHIPPED | OUTPATIENT
Start: 2024-01-11 | End: 2024-02-02 | Stop reason: SDUPTHER

## 2024-01-11 RX ORDER — VALSARTAN AND HYDROCHLOROTHIAZIDE 160; 12.5 MG/1; MG/1
1 TABLET, FILM COATED ORAL DAILY
Qty: 30 TABLET | Refills: 0 | Status: CANCELLED | OUTPATIENT
Start: 2024-01-11

## 2024-01-11 RX ORDER — VALSARTAN AND HYDROCHLOROTHIAZIDE 160; 12.5 MG/1; MG/1
1 TABLET, FILM COATED ORAL DAILY
Qty: 90 TABLET | Refills: 3 | Status: SHIPPED | OUTPATIENT
Start: 2024-01-11 | End: 2024-04-05 | Stop reason: SDUPTHER

## 2024-01-28 PROBLEM — F51.04 PSYCHOPHYSIOLOGICAL INSOMNIA: Chronic | Status: ACTIVE | Noted: 2024-01-28

## 2024-01-28 PROBLEM — F41.9 ANXIETY: Chronic | Status: ACTIVE | Noted: 2024-01-28

## 2024-01-29 ENCOUNTER — INFUSION (OUTPATIENT)
Dept: INFUSION THERAPY | Facility: HOSPITAL | Age: 89
End: 2024-01-29
Attending: FAMILY MEDICINE
Payer: MEDICARE

## 2024-01-29 VITALS
HEART RATE: 85 BPM | RESPIRATION RATE: 18 BRPM | SYSTOLIC BLOOD PRESSURE: 150 MMHG | TEMPERATURE: 98 F | DIASTOLIC BLOOD PRESSURE: 66 MMHG | OXYGEN SATURATION: 96 %

## 2024-01-29 DIAGNOSIS — M85.80 OSTEOPENIA WITH HIGH RISK OF FRACTURE: Primary | ICD-10-CM

## 2024-01-29 PROCEDURE — 63600175 PHARM REV CODE 636 W HCPCS: Mod: JZ,JG,HCNC | Performed by: FAMILY MEDICINE

## 2024-01-29 PROCEDURE — 96372 THER/PROPH/DIAG INJ SC/IM: CPT | Mod: HCNC

## 2024-01-29 RX ADMIN — DENOSUMAB 60 MG: 60 INJECTION SUBCUTANEOUS at 08:01

## 2024-01-29 NOTE — ASSESSMENT & PLAN NOTE
Will recheck renal function panel given decrease with last test.  Importance of good hydration and avoiding nephrotoxic agents discussed

## 2024-01-29 NOTE — PROGRESS NOTES
Patient Name: Debbie Earl    : 1935  MRN: [id number]      Subjective:     Patient ID: Debbie is a 88 y.o. female    Chief Complaint:  Hypertension    Hypertension  This is a chronic problem. The current episode started more than 1 year ago. The problem is controlled. Pertinent negatives include no anxiety, blurred vision, chest pain, headaches, malaise/fatigue, neck pain, orthopnea, palpitations, peripheral edema, PND, shortness of breath or sweats. Risk factors for coronary artery disease include post-menopausal state and sedentary lifestyle. Past treatments include diuretics, direct vasodilators, angiotensin blockers and beta blockers. The current treatment provides significant improvement. There are no compliance problems.  Hypertensive end-organ damage includes kidney disease. There is no history of heart failure. Identifiable causes of hypertension include chronic renal disease.        Review of Systems   Constitutional:  Negative for malaise/fatigue.   Eyes:  Negative for blurred vision.   Respiratory:  Negative for shortness of breath.    Cardiovascular:  Negative for chest pain, palpitations, orthopnea and PND.   Musculoskeletal:  Negative for neck pain.   Neurological:  Negative for headaches.        Objective:   /70 (BP Location: Left arm, Patient Position: Sitting, BP Method: Large (Manual))   Pulse 95   Temp 98.6 °F (37 °C) (Oral)   Ht 5' (1.524 m)   Wt 65.7 kg (144 lb 13.5 oz)   SpO2 95%   BMI 28.29 kg/m²     Physical Exam  Vitals reviewed.   Constitutional:       General: She is not in acute distress.  HENT:      Head: Normocephalic and atraumatic.      Right Ear: Ear canal and external ear normal.      Left Ear: Ear canal and external ear normal.      Nose: Nose normal.      Mouth/Throat:      Mouth: Mucous membranes are moist.      Pharynx: No oropharyngeal exudate or posterior oropharyngeal erythema.   Eyes:      Extraocular Movements: Extraocular movements intact.       Conjunctiva/sclera: Conjunctivae normal.      Pupils: Pupils are equal, round, and reactive to light.   Cardiovascular:      Rate and Rhythm: Normal rate and regular rhythm.   Pulmonary:      Effort: Pulmonary effort is normal. No respiratory distress.      Breath sounds: No wheezing, rhonchi or rales.   Abdominal:      General: Abdomen is flat. Bowel sounds are normal. There is no distension.      Palpations: Abdomen is soft.      Tenderness: There is no abdominal tenderness. There is no guarding.   Musculoskeletal:      Cervical back: Normal range of motion. No rigidity or tenderness.   Lymphadenopathy:      Cervical: No cervical adenopathy.   Skin:     General: Skin is warm.      Capillary Refill: Capillary refill takes less than 2 seconds.   Neurological:      Mental Status: She is alert and oriented to person, place, and time.      Cranial Nerves: No cranial nerve deficit.      Sensory: No sensory deficit.   Psychiatric:         Mood and Affect: Mood normal.         Behavior: Behavior normal.        Component Date Value Ref Range Status    Sodium 01/06/2024 141  136 - 145 mmol/L Final    Potassium 01/06/2024 3.9  3.5 - 5.1 mmol/L Final    Chloride 01/06/2024 104  95 - 110 mmol/L Final    CO2 01/06/2024 26  23 - 29 mmol/L Final    Glucose 01/06/2024 88  70 - 110 mg/dL Final    BUN 01/06/2024 26 (H)  8 - 23 mg/dL Final    Creatinine 01/06/2024 1.2  0.5 - 1.4 mg/dL Final    Calcium 01/06/2024 9.9  8.7 - 10.5 mg/dL Final    Total Protein 01/06/2024 7.6  6.0 - 8.4 g/dL Final    Albumin 01/06/2024 3.6  3.5 - 5.2 g/dL Final    Total Bilirubin 01/06/2024 0.8  0.1 - 1.0 mg/dL Final    Comment: For infants and newborns, interpretation of results should be based  on gestational age, weight and in agreement with clinical  observations.    Premature Infant recommended reference ranges:  Up to 24 hours.............<8.0 mg/dL  Up to 48 hours............<12.0 mg/dL  3-5 days..................<15.0 mg/dL  6-29  days.................<15.0 mg/dL      Alkaline Phosphatase 01/06/2024 73  55 - 135 U/L Final    AST 01/06/2024 27  10 - 40 U/L Final    ALT 01/06/2024 13  10 - 44 U/L Final    eGFR 01/06/2024 43.5 (A)  >60 mL/min/1.73 m^2 Final    Anion Gap 01/06/2024 11  8 - 16 mmol/L Final    WBC 01/06/2024 5.94  3.90 - 12.70 K/uL Final    RBC 01/06/2024 4.72  4.00 - 5.40 M/uL Final    Hemoglobin 01/06/2024 13.4  12.0 - 16.0 g/dL Final    Hematocrit 01/06/2024 42.2  37.0 - 48.5 % Final    MCV 01/06/2024 89  82 - 98 fL Final    MCH 01/06/2024 28.4  27.0 - 31.0 pg Final    MCHC 01/06/2024 31.8 (L)  32.0 - 36.0 g/dL Final    RDW 01/06/2024 15.5 (H)  11.5 - 14.5 % Final    Platelets 01/06/2024 264  150 - 450 K/uL Final    MPV 01/06/2024 10.9  9.2 - 12.9 fL Final    Immature Granulocytes 01/06/2024 0.2  0.0 - 0.5 % Final    Gran # (ANC) 01/06/2024 3.2  1.8 - 7.7 K/uL Final    Immature Grans (Abs) 01/06/2024 0.01  0.00 - 0.04 K/uL Final    Comment: Mild elevation in immature granulocytes is non specific and   can be seen in a variety of conditions including stress response,   acute inflammation, trauma and pregnancy. Correlation with other   laboratory and clinical findings is essential.      Lymph # 01/06/2024 2.1  1.0 - 4.8 K/uL Final    Mono # 01/06/2024 0.5  0.3 - 1.0 K/uL Final    Eos # 01/06/2024 0.1  0.0 - 0.5 K/uL Final    Baso # 01/06/2024 0.03  0.00 - 0.20 K/uL Final    nRBC 01/06/2024 0  0 /100 WBC Final    Gran % 01/06/2024 53.5  38.0 - 73.0 % Final    Lymph % 01/06/2024 35.0  18.0 - 48.0 % Final    Mono % 01/06/2024 8.8  4.0 - 15.0 % Final    Eosinophil % 01/06/2024 2.0  0.0 - 8.0 % Final    Basophil % 01/06/2024 0.5  0.0 - 1.9 % Final    Differential Method 01/06/2024 Automated   Final    Cholesterol 01/06/2024 158  120 - 199 mg/dL Final    Comment: The National Cholesterol Education Program (NCEP) has set the  following guidelines (reference ranges) for Cholesterol:  Optimal.....................<200 mg/dL  Borderline  High.............200-239 mg/dL  High........................> or = 240 mg/dL      Triglycerides 01/06/2024 101  30 - 150 mg/dL Final    Comment: The National Cholesterol Education Program (NCEP) has set the  following guidelines (reference values) for triglycerides:  Normal......................<150 mg/dL  Borderline High.............150-199 mg/dL  High........................200-499 mg/dL      HDL 01/06/2024 46  40 - 75 mg/dL Final    Comment: The National Cholesterol Education Program (NCEP) has set the  following guidelines (reference values) for HDL Cholesterol:  Low...............<40 mg/dL  Optimal...........>60 mg/dL      LDL Cholesterol 01/06/2024 91.8  63.0 - 159.0 mg/dL Final    Comment: The National Cholesterol Education Program (NCEP) has set the  following guidelines (reference values) for LDL Cholesterol:  Optimal.......................<130 mg/dL  Borderline High...............130-159 mg/dL  High..........................160-189 mg/dL  Very High.....................>190 mg/dL      HDL/Cholesterol Ratio 01/06/2024 29.1  20.0 - 50.0 % Final    Total Cholesterol/HDL Ratio 01/06/2024 3.4  2.0 - 5.0 Final    Non-HDL Cholesterol 01/06/2024 112  mg/dL Final    Comment: Risk category and Non-HDL cholesterol goals:  Coronary heart disease (CHD)or equivalent (10-year risk of CHD >20%):  Non-HDL cholesterol goal     <130 mg/dL  Two or more CHD risk factors and 10-year risk of CHD <= 20%:  Non-HDL cholesterol goal     <160 mg/dL  0 to 1 CHD risk factor:  Non-HDL cholesterol goal     <190 mg/dL         Assessment        ICD-10-CM ICD-9-CM   1. Essential hypertension, benign  I10 401.1   2. Stage 3a chronic kidney disease  N18.31 585.3   3. Acute renal impairment  N28.9 593.9   4. Anxiety  F41.9 300.00   5. Psychophysiological insomnia  F51.04 307.42         Plan:     1. Essential hypertension, benign  Assessment & Plan:  Fair blood pressure control.  Continue current regimen.    Orders:  -      valsartan-hydrochlorothiazide (DIOVAN-HCT) 160-12.5 mg per tablet; Take 1 tablet by mouth once daily.  Dispense: 90 tablet; Refill: 3    2. Stage 3a chronic kidney disease  Overview:  Lab Results   Component Value Date    EGFRNORACEVR 48 (A) 01/11/2024    EGFRNORACEVR 43.5 (A) 01/06/2024    EGFRNORACEVR 54.5 (A) 06/10/2023    EGFRNORACEVR 44 (A) 12/09/2022        Assessment & Plan:  Will recheck renal function panel given decrease with last test.  Importance of good hydration and avoiding nephrotoxic agents discussed    Orders:  -     RENAL FUNCTION PANEL; Future; Expected date: 01/11/2024    3. Acute renal impairment  -     RENAL FUNCTION PANEL; Future; Expected date: 01/11/2024    4. Anxiety  Assessment & Plan:  Patient having anxiety revolving around 's medical conditions.  Daughter reports that patient over worries.  Discussed with patient starting a low-dose medication to see if we can start helping with her anxiety and also improve her sleep.  Agrees to start Remeron and do re-evaluation in 4 to 6 weeks.    Orders:  -     mirtazapine (REMERON) 7.5 MG Tab; Take 1 tablet (7.5 mg total) by mouth every evening.  Dispense: 90 tablet; Refill: 0    5. Psychophysiological insomnia  Assessment & Plan:  Discussed starting Remeron an hour prior to bed to help with insomnia.  Risks and benefits discussed.  Re-evaluate in 4 to 6 weeks.    Orders:  -     mirtazapine (REMERON) 7.5 MG Tab; Take 1 tablet (7.5 mg total) by mouth every evening.  Dispense: 90 tablet; Refill: 0               -Vasiliy Oneill Jr., MD, AAHIVS      This office note has been dictated.  This dictation has been generated using M-Modal Fluency Direct dictation; some phonetic errors may occur.         There are no Patient Instructions on file for this visit.    Follow Up  Follow up in about 2 weeks (around 1/25/2024) for Virtual for anxiety; 4 month with provider.    Future Appointments   Date Time Provider Department Center   1/29/2024  9:00 AM  CHAIR 02 [facility]   2/2/2024  1:00 PM Vasiliy Oneill Jr., MD [facility]   5/16/2024 10:00 AM Vasiliy Oneill Jr., MD [facility]

## 2024-01-29 NOTE — ASSESSMENT & PLAN NOTE
Patient having anxiety revolving around 's medical conditions.  Daughter reports that patient over worries.  Discussed with patient starting a low-dose medication to see if we can start helping with her anxiety and also improve her sleep.  Agrees to start Remeron and do re-evaluation in 4 to 6 weeks.

## 2024-01-29 NOTE — ASSESSMENT & PLAN NOTE
Discussed starting Remeron an hour prior to bed to help with insomnia.  Risks and benefits discussed.  Re-evaluate in 4 to 6 weeks.

## 2024-02-02 ENCOUNTER — OFFICE VISIT (OUTPATIENT)
Dept: FAMILY MEDICINE | Facility: CLINIC | Age: 89
End: 2024-02-02
Payer: MEDICARE

## 2024-02-02 VITALS — SYSTOLIC BLOOD PRESSURE: 110 MMHG | DIASTOLIC BLOOD PRESSURE: 50 MMHG

## 2024-02-02 DIAGNOSIS — F41.9 ANXIETY: Chronic | ICD-10-CM

## 2024-02-02 DIAGNOSIS — F51.04 PSYCHOPHYSIOLOGICAL INSOMNIA: Chronic | ICD-10-CM

## 2024-02-02 PROCEDURE — 99214 OFFICE O/P EST MOD 30 MIN: CPT | Mod: HCNC,95,, | Performed by: FAMILY MEDICINE

## 2024-02-02 PROCEDURE — 1159F MED LIST DOCD IN RCRD: CPT | Mod: HCNC,CPTII,95, | Performed by: FAMILY MEDICINE

## 2024-02-02 PROCEDURE — 1160F RVW MEDS BY RX/DR IN RCRD: CPT | Mod: HCNC,CPTII,95, | Performed by: FAMILY MEDICINE

## 2024-02-02 NOTE — PROGRESS NOTES
Patient Name: Debbie Earl    : 1935  MRN: [id number]    The patient location is: [place], [place]  The chief complaint leading to consultation is: Anxiety/Insomnia Follow up    Visit type: audiovisual    Face to Face time with patient: 17 minutes  18 minutes of total time spent on the encounter, which includes face to face time and non-face to face time preparing to see the patient (eg, review of tests), Obtaining and/or reviewing separately obtained history, Documenting clinical information in the electronic or other health record, Independently interpreting results (not separately reported) and communicating results to the patient/family/caregiver, or Care coordination (not separately reported).         Each patient to whom he or she provides medical services by telemedicine is:  (1) informed of the relationship between the physician and patient and the respective role of any other health care provider with respect to management of the patient; and (2) notified that he or she may decline to receive medical services by telemedicine and may withdraw from such care at any time.    Notes:     Subjective:     Patient ID: Debbie is a 88 y.o. female    Chief Complaint:  No chief complaint on file.    88-year-old female makes virtual visit follow-up on anxiety and the insomnia since starting mirtazapine.  She and her daughter report that the patient is doing much better in terms of anxiety and sleeping better.  Reports no side effects.         Review of Systems   Respiratory:  Negative for shortness of breath and wheezing.    Psychiatric/Behavioral:  Negative for decreased concentration, dysphoric mood, hallucinations, sleep disturbance and suicidal ideas. The patient is not nervous/anxious.         Objective:   BP (!) 110/50 Comment: measured by daughter just prior to visit- asymptomatic    Physical Exam  Pulmonary:      Effort: Pulmonary effort is normal.   Neurological:      Mental Status: She is alert.   Psychiatric:          Mood and Affect: Mood normal.         Behavior: Behavior normal.        Assessment        ICD-10-CM ICD-9-CM   1. Anxiety  F41.9 300.00   2. Psychophysiological insomnia  F51.04 307.42         Plan:     1. Anxiety/2. Psychophysiological insomnia  -     mirtazapine (REMERON) 7.5 MG Tab; Take 1 tablet (7.5 mg total) by mouth every evening.  Dispense: 90 tablet; Refill: 1  -     Comprehensive Metabolic Panel; Future; Expected date: 05/02/2024  Both doing better.  Continue Remeron.           -Vasiliy Oneill Jr., MD, AAHIVS      This office note has been dictated.  This dictation has been generated using M-Modal Fluency Direct dictation; some phonetic errors may occur.         There are no Patient Instructions on file for this visit.      No follow-ups on file.   Future Appointments   Date Time Provider Department Center   5/16/2024 10:00 AM Vasiliy Oneill Jr., MD [facility]   7/22/2024  9:00 AM CHAIR 13 [facility]

## 2024-02-12 RX ORDER — MIRTAZAPINE 7.5 MG/1
7.5 TABLET, FILM COATED ORAL NIGHTLY
Qty: 90 TABLET | Refills: 1 | Status: SHIPPED | OUTPATIENT
Start: 2024-02-12 | End: 2024-04-05 | Stop reason: SDUPTHER

## 2024-04-05 DIAGNOSIS — F41.9 ANXIETY: Chronic | ICD-10-CM

## 2024-04-05 DIAGNOSIS — F51.04 PSYCHOPHYSIOLOGICAL INSOMNIA: Chronic | ICD-10-CM

## 2024-04-05 DIAGNOSIS — I10 ESSENTIAL HYPERTENSION, BENIGN: Chronic | ICD-10-CM

## 2024-04-05 RX ORDER — VALSARTAN AND HYDROCHLOROTHIAZIDE 160; 12.5 MG/1; MG/1
1 TABLET, FILM COATED ORAL DAILY
Qty: 90 TABLET | Refills: 3 | Status: SHIPPED | OUTPATIENT
Start: 2024-04-05

## 2024-04-05 RX ORDER — MIRTAZAPINE 7.5 MG/1
7.5 TABLET, FILM COATED ORAL NIGHTLY
Qty: 90 TABLET | Refills: 3 | Status: SHIPPED | OUTPATIENT
Start: 2024-04-05 | End: 2025-03-31

## 2024-04-05 NOTE — TELEPHONE ENCOUNTER
Refill Decision Note   Debbie Earl  is requesting a refill authorization.  Brief Assessment and Rationale for Refill:  Approve     Medication Therapy Plan:         Comments:     Note composed:4:36 PM 04/05/2024

## 2024-04-05 NOTE — TELEPHONE ENCOUNTER
----- Message from Lyubov Napoles MA sent at 4/5/2024  3:22 PM CDT -----  Type: Patient Call Back    Who called: [facility]     What is the request in detail: following up on refill request for the pt.    mirtazapine (REMERON) 7.5 MG Tab  valsartan-hydrochlorothiazide (DIOVAN-HCT) 160-12.5 mg per tablet    Can the clinic reply by [facility]?No    Would the patient rather a call back or a response via [facility]? Yes, call     Best call back number: [phone]

## 2024-04-05 NOTE — TELEPHONE ENCOUNTER
No care due was identified.  [facility] Embedded Care Due Messages. Reference number: [id number].   4/05/2024 4:28:34 PM CDT

## 2024-05-16 ENCOUNTER — OFFICE VISIT (OUTPATIENT)
Dept: FAMILY MEDICINE | Facility: CLINIC | Age: 89
End: 2024-05-16
Payer: MEDICARE

## 2024-05-16 ENCOUNTER — LAB VISIT (OUTPATIENT)
Dept: LAB | Facility: HOSPITAL | Age: 89
End: 2024-05-16
Attending: FAMILY MEDICINE
Payer: MEDICARE

## 2024-05-16 VITALS
BODY MASS INDEX: 27.18 KG/M2 | HEIGHT: 60 IN | SYSTOLIC BLOOD PRESSURE: 134 MMHG | DIASTOLIC BLOOD PRESSURE: 75 MMHG | TEMPERATURE: 98 F | WEIGHT: 138.44 LBS | OXYGEN SATURATION: 94 % | HEART RATE: 76 BPM

## 2024-05-16 DIAGNOSIS — I10 ESSENTIAL HYPERTENSION, BENIGN: Primary | Chronic | ICD-10-CM

## 2024-05-16 DIAGNOSIS — I27.20 PULMONARY HYPERTENSION: Chronic | ICD-10-CM

## 2024-05-16 DIAGNOSIS — F41.9 ANXIETY: Chronic | ICD-10-CM

## 2024-05-16 DIAGNOSIS — N18.31 STAGE 3A CHRONIC KIDNEY DISEASE: Chronic | ICD-10-CM

## 2024-05-16 DIAGNOSIS — F51.04 PSYCHOPHYSIOLOGICAL INSOMNIA: Chronic | ICD-10-CM

## 2024-05-16 LAB
CREAT UR-MCNC: 57.8 MG/DL (ref 15–325)
PROT UR-MCNC: 8 MG/DL
PROT/CREAT UR: 0.14 MG/G{CREAT} (ref 0–0.2)

## 2024-05-16 PROCEDURE — 82570 ASSAY OF URINE CREATININE: CPT | Mod: HCNC | Performed by: FAMILY MEDICINE

## 2024-05-16 PROCEDURE — G2211 COMPLEX E/M VISIT ADD ON: HCPCS | Mod: HCNC,S$GLB,, | Performed by: FAMILY MEDICINE

## 2024-05-16 PROCEDURE — 3288F FALL RISK ASSESSMENT DOCD: CPT | Mod: HCNC,CPTII,S$GLB, | Performed by: FAMILY MEDICINE

## 2024-05-16 PROCEDURE — 99214 OFFICE O/P EST MOD 30 MIN: CPT | Mod: HCNC,S$GLB,, | Performed by: FAMILY MEDICINE

## 2024-05-16 PROCEDURE — 99999 PR PBB SHADOW E&M-EST. PATIENT-LVL IV: CPT | Mod: PBBFAC,HCNC,, | Performed by: FAMILY MEDICINE

## 2024-05-16 PROCEDURE — 1160F RVW MEDS BY RX/DR IN RCRD: CPT | Mod: HCNC,CPTII,S$GLB, | Performed by: FAMILY MEDICINE

## 2024-05-16 PROCEDURE — 1126F AMNT PAIN NOTED NONE PRSNT: CPT | Mod: HCNC,CPTII,S$GLB, | Performed by: FAMILY MEDICINE

## 2024-05-16 PROCEDURE — 1101F PT FALLS ASSESS-DOCD LE1/YR: CPT | Mod: HCNC,CPTII,S$GLB, | Performed by: FAMILY MEDICINE

## 2024-05-16 PROCEDURE — 1159F MED LIST DOCD IN RCRD: CPT | Mod: HCNC,CPTII,S$GLB, | Performed by: FAMILY MEDICINE

## 2024-05-16 RX ORDER — FUROSEMIDE 20 MG/1
1 TABLET ORAL EVERY OTHER DAY
COMMUNITY
Start: 2024-05-08 | End: 2025-05-08

## 2024-05-17 ENCOUNTER — PATIENT MESSAGE (OUTPATIENT)
Dept: FAMILY MEDICINE | Facility: CLINIC | Age: 89
End: 2024-05-17
Payer: MEDICARE

## 2024-05-19 PROBLEM — I27.20 PULMONARY HYPERTENSION: Chronic | Status: ACTIVE | Noted: 2024-05-19

## 2024-05-19 PROBLEM — I27.20 PULMONARY HYPERTENSION: Chronic | Status: ACTIVE | Noted: 2024-05-16

## 2024-05-19 NOTE — ASSESSMENT & PLAN NOTE
Renal function stable.  Continue good hydration.  Continue avoiding nephrotoxic agents such as nonsteroidal anti-inflammatories and Bactrim.

## 2024-05-19 NOTE — PROGRESS NOTES
Patient Name: Debbie Earl    : 1935  MRN: [id number]      Subjective:     Patient ID: Debbie is a 88 y.o. female    Chief Complaint:  Hypertension, Chronic Kidney Disease, and Health Maintenance    80-year-old female presents with her daughter for follow-up on hypertension, chronic kidney disease, anxiety, and insomnia.  Since her last visit, she is seen cardiology.  She had an echocardiogram as she was found to have pulmonary hypertension.  She was prescribed furosemide.         Review of Systems   Constitutional:  Negative for unexpected weight change.   HENT:  Negative for ear pain and sore throat.    Eyes:  Negative for visual disturbance.   Respiratory:  Positive for cough. Negative for shortness of breath.    Cardiovascular:  Negative for chest pain and palpitations.   Gastrointestinal:  Negative for abdominal pain and blood in stool.   Genitourinary:  Negative for dysuria and frequency.   Musculoskeletal:  Negative for neck pain.   Integumentary:  Negative for rash.   Neurological:  Negative for weakness, numbness and headaches.   Hematological:  Negative for adenopathy.   Psychiatric/Behavioral:  Negative for suicidal ideas.         Objective:   /75 (BP Location: Left arm, Patient Position: Sitting, BP Method: Large (Automatic))   Pulse 76   Temp 97.8 °F (36.6 °C) (Oral)   Ht 5' (1.524 m)   Wt 62.8 kg (138 lb 7.2 oz)   SpO2 (!) 94%   BMI 27.04 kg/m²     Physical Exam  Vitals reviewed.   Constitutional:       General: She is not in acute distress.  HENT:      Head: Normocephalic and atraumatic.      Right Ear: Ear canal and external ear normal.      Left Ear: Ear canal and external ear normal.      Nose: Nose normal.      Mouth/Throat:      Mouth: Mucous membranes are moist.      Pharynx: No oropharyngeal exudate or posterior oropharyngeal erythema.   Eyes:      Extraocular Movements: Extraocular movements intact.      Conjunctiva/sclera: Conjunctivae normal.      Pupils: Pupils are equal,  round, and reactive to light.   Cardiovascular:      Rate and Rhythm: Normal rate and regular rhythm.   Pulmonary:      Effort: Pulmonary effort is normal. No respiratory distress.      Breath sounds: No wheezing, rhonchi or rales.   Abdominal:      General: Abdomen is flat. Bowel sounds are normal. There is no distension.      Palpations: Abdomen is soft.      Tenderness: There is no abdominal tenderness. There is no guarding.   Musculoskeletal:      Cervical back: Normal range of motion. No rigidity or tenderness.   Lymphadenopathy:      Cervical: No cervical adenopathy.   Skin:     General: Skin is warm.      Capillary Refill: Capillary refill takes less than 2 seconds.   Neurological:      Mental Status: She is alert and oriented to person, place, and time.      Cranial Nerves: No cranial nerve deficit.      Sensory: No sensory deficit.   Psychiatric:         Mood and Affect: Mood normal.         Behavior: Behavior normal.        Assessment        ICD-10-CM ICD-9-CM   1. Essential hypertension, benign  I10 401.1   2. Pulmonary hypertension  I27.20 416.8   3. Stage 3a chronic kidney disease  N18.31 585.3   4. Anxiety  F41.9 300.00   5. Psychophysiological insomnia  F51.04 307.42         Plan:     1. Essential hypertension, benign  Assessment & Plan:  Blood pressure is at goal.  Will continue current regimen.    Orders:  -     Comprehensive Metabolic Panel; Future; Expected date: 05/16/2024  -     CBC Without Differential; Future; Expected date: 05/16/2024  -     Comprehensive Metabolic Panel; Future; Expected date: 11/16/2024  -     CBC Without Differential; Future; Expected date: 11/16/2024  -     Lipid Panel; Future; Expected date: 11/16/2024    2. Pulmonary hypertension  Overview:  03-  Echocardiogram  RVSP ~ 55.4 mmHg.     [place] [facility]    Assessment & Plan:  Patient was started on furosemide by cardiologist.  Recommended to take medication as prescribed.    Orders:  -      Comprehensive Metabolic Panel; Future; Expected date: 05/16/2024  -     CBC Without Differential; Future; Expected date: 05/16/2024  -     Comprehensive Metabolic Panel; Future; Expected date: 11/16/2024  -     CBC Without Differential; Future; Expected date: 11/16/2024  -     Lipid Panel; Future; Expected date: 11/16/2024    3. Stage 3a chronic kidney disease  Overview:  Lab Results   Component Value Date    EGFRNORACEVR 48 (A) 05/16/2024    EGFRNORACEVR 48 (A) 01/11/2024    EGFRNORACEVR 43.5 (A) 01/06/2024    EGFRNORACEVR 54.5 (A) 06/10/2023        Assessment & Plan:  Renal function stable.  Continue good hydration.  Continue avoiding nephrotoxic agents such as nonsteroidal anti-inflammatories and Bactrim.    Orders:  -     Comprehensive Metabolic Panel; Future; Expected date: 05/16/2024  -     CBC Without Differential; Future; Expected date: 05/16/2024  -     Protein / creatinine ratio, urine; Future; Expected date: 05/16/2024  -     Comprehensive Metabolic Panel; Future; Expected date: 11/16/2024  -     CBC Without Differential; Future; Expected date: 11/16/2024  -     Lipid Panel; Future; Expected date: 11/16/2024    4. Anxiety  Assessment & Plan:  Continue mirtazapine.      5. Psychophysiological insomnia  Assessment & Plan:  Reports mirtazapine working well.  Will continue.             -Vasiliy Oneill Jr., MD, AAHIVS      This office note has been dictated.  This dictation has been generated using M-Modal Fluency Direct dictation; some phonetic errors may occur.         There are no Patient Instructions on file for this visit.      No follow-ups on file.   Future Appointments   Date Time Provider Department Center   7/22/2024  9:00 AM CHAIR 13 [facility]   11/16/2024  8:00 AM LAB, [facility]   11/21/2024 10:00 AM Vasiliy Oneill Jr., MD [facility]

## 2024-05-23 DIAGNOSIS — I10 ESSENTIAL HYPERTENSION, BENIGN: Chronic | ICD-10-CM

## 2024-05-23 RX ORDER — HYDRALAZINE HYDROCHLORIDE 100 MG/1
100 TABLET, FILM COATED ORAL 2 TIMES DAILY
Qty: 180 TABLET | Refills: 3 | Status: SHIPPED | OUTPATIENT
Start: 2024-05-23

## 2024-05-23 NOTE — TELEPHONE ENCOUNTER
No care due was identified.  [facility] Embedded Care Due Messages. Reference number: [id number].   5/23/2024 1:11:49 AM CDT

## 2024-05-23 NOTE — TELEPHONE ENCOUNTER
Refill Decision Note   Debbie Earl  is requesting a refill authorization.  Brief Assessment and Rationale for Refill:  Approve     Medication Therapy Plan:         Comments:     Note composed:8:05 AM 05/23/2024

## 2024-07-22 ENCOUNTER — INFUSION (OUTPATIENT)
Dept: INFUSION THERAPY | Facility: HOSPITAL | Age: 89
End: 2024-07-22
Attending: FAMILY MEDICINE
Payer: MEDICARE

## 2024-07-22 VITALS
HEART RATE: 89 BPM | DIASTOLIC BLOOD PRESSURE: 63 MMHG | OXYGEN SATURATION: 95 % | SYSTOLIC BLOOD PRESSURE: 135 MMHG | TEMPERATURE: 98 F | RESPIRATION RATE: 18 BRPM

## 2024-07-22 DIAGNOSIS — M85.80 OSTEOPENIA WITH HIGH RISK OF FRACTURE: Primary | ICD-10-CM

## 2024-07-22 PROCEDURE — 63600175 PHARM REV CODE 636 W HCPCS: Mod: JZ,JG,HCNC | Performed by: FAMILY MEDICINE

## 2024-07-22 PROCEDURE — 96372 THER/PROPH/DIAG INJ SC/IM: CPT | Mod: HCNC

## 2024-07-22 RX ADMIN — DENOSUMAB 60 MG: 60 INJECTION SUBCUTANEOUS at 09:07

## 2024-07-22 NOTE — PLAN OF CARE
Pt arrived to unit ambulatory, alert and oriented. Pt received Prolia injection with no S&S of complications. Pt discharged home in [facility].

## 2024-11-16 ENCOUNTER — LAB VISIT (OUTPATIENT)
Dept: LAB | Facility: HOSPITAL | Age: 89
End: 2024-11-16
Attending: FAMILY MEDICINE
Payer: MEDICARE

## 2024-11-16 DIAGNOSIS — I27.20 PULMONARY HYPERTENSION: Chronic | ICD-10-CM

## 2024-11-16 DIAGNOSIS — N18.31 STAGE 3A CHRONIC KIDNEY DISEASE: Chronic | ICD-10-CM

## 2024-11-16 DIAGNOSIS — I10 ESSENTIAL HYPERTENSION, BENIGN: Chronic | ICD-10-CM

## 2024-11-16 LAB
ALBUMIN SERPL BCP-MCNC: 3.4 G/DL (ref 3.5–5.2)
ALP SERPL-CCNC: 60 U/L (ref 40–150)
ALT SERPL W/O P-5'-P-CCNC: 21 U/L (ref 10–44)
ANION GAP SERPL CALC-SCNC: 14 MMOL/L (ref 8–16)
AST SERPL-CCNC: 25 U/L (ref 10–40)
BILIRUB SERPL-MCNC: 0.9 MG/DL (ref 0.1–1)
BUN SERPL-MCNC: 22 MG/DL (ref 8–23)
CALCIUM SERPL-MCNC: 9.6 MG/DL (ref 8.7–10.5)
CHLORIDE SERPL-SCNC: 100 MMOL/L (ref 95–110)
CHOLEST SERPL-MCNC: 199 MG/DL (ref 120–199)
CHOLEST/HDLC SERPL: 2.6 {RATIO} (ref 2–5)
CO2 SERPL-SCNC: 29 MMOL/L (ref 23–29)
CREAT SERPL-MCNC: 1.1 MG/DL (ref 0.5–1.4)
ERYTHROCYTE [DISTWIDTH] IN BLOOD BY AUTOMATED COUNT: 14 % (ref 11.5–14.5)
EST. GFR  (NO RACE VARIABLE): 48.3 ML/MIN/1.73 M^2
GLUCOSE SERPL-MCNC: 85 MG/DL (ref 70–110)
HCT VFR BLD AUTO: 41.8 % (ref 37–48.5)
HDLC SERPL-MCNC: 78 MG/DL (ref 40–75)
HDLC SERPL: 39.2 % (ref 20–50)
HGB BLD-MCNC: 13.9 G/DL (ref 12–16)
LDLC SERPL CALC-MCNC: 95.6 MG/DL (ref 63–159)
MCH RBC QN AUTO: 32.1 PG (ref 27–31)
MCHC RBC AUTO-ENTMCNC: 33.3 G/DL (ref 32–36)
MCV RBC AUTO: 97 FL (ref 82–98)
NONHDLC SERPL-MCNC: 121 MG/DL
PLATELET # BLD AUTO: 252 K/UL (ref 150–450)
PMV BLD AUTO: 10.7 FL (ref 9.2–12.9)
POTASSIUM SERPL-SCNC: 3.7 MMOL/L (ref 3.5–5.1)
PROT SERPL-MCNC: 6.3 G/DL (ref 6–8.4)
RBC # BLD AUTO: 4.33 M/UL (ref 4–5.4)
SODIUM SERPL-SCNC: 143 MMOL/L (ref 136–145)
TRIGL SERPL-MCNC: 127 MG/DL (ref 30–150)
WBC # BLD AUTO: 12.49 K/UL (ref 3.9–12.7)

## 2024-11-16 PROCEDURE — 80061 LIPID PANEL: CPT | Mod: HCNC | Performed by: FAMILY MEDICINE

## 2024-11-16 PROCEDURE — 80053 COMPREHEN METABOLIC PANEL: CPT | Mod: HCNC | Performed by: FAMILY MEDICINE

## 2024-11-16 PROCEDURE — 36415 COLL VENOUS BLD VENIPUNCTURE: CPT | Mod: HCNC,PO | Performed by: FAMILY MEDICINE

## 2024-11-16 PROCEDURE — 85027 COMPLETE CBC AUTOMATED: CPT | Mod: HCNC | Performed by: FAMILY MEDICINE

## 2024-11-21 ENCOUNTER — PATIENT MESSAGE (OUTPATIENT)
Dept: FAMILY MEDICINE | Facility: CLINIC | Age: 89
End: 2024-11-21

## 2024-11-21 ENCOUNTER — OFFICE VISIT (OUTPATIENT)
Dept: FAMILY MEDICINE | Facility: CLINIC | Age: 89
End: 2024-11-21
Payer: MEDICARE

## 2024-11-21 ENCOUNTER — LAB VISIT (OUTPATIENT)
Dept: LAB | Facility: HOSPITAL | Age: 89
End: 2024-11-21
Attending: FAMILY MEDICINE
Payer: MEDICARE

## 2024-11-21 VITALS
OXYGEN SATURATION: 88 % | DIASTOLIC BLOOD PRESSURE: 64 MMHG | RESPIRATION RATE: 18 BRPM | BODY MASS INDEX: 27.09 KG/M2 | WEIGHT: 138 LBS | HEART RATE: 69 BPM | SYSTOLIC BLOOD PRESSURE: 130 MMHG | HEIGHT: 60 IN

## 2024-11-21 DIAGNOSIS — F51.04 PSYCHOPHYSIOLOGICAL INSOMNIA: Chronic | ICD-10-CM

## 2024-11-21 DIAGNOSIS — I70.0 AORTIC ATHEROSCLEROSIS: Chronic | ICD-10-CM

## 2024-11-21 DIAGNOSIS — E04.9 THYROID ENLARGEMENT: ICD-10-CM

## 2024-11-21 DIAGNOSIS — I10 ESSENTIAL HYPERTENSION, BENIGN: Primary | Chronic | ICD-10-CM

## 2024-11-21 DIAGNOSIS — N18.31 STAGE 3A CHRONIC KIDNEY DISEASE: Chronic | ICD-10-CM

## 2024-11-21 LAB
T4 FREE SERPL-MCNC: 1.14 NG/DL (ref 0.71–1.51)
TSH SERPL DL<=0.005 MIU/L-ACNC: 0.29 UIU/ML (ref 0.4–4)

## 2024-11-21 PROCEDURE — 36415 COLL VENOUS BLD VENIPUNCTURE: CPT | Mod: HCNC,PN | Performed by: FAMILY MEDICINE

## 2024-11-21 PROCEDURE — 84443 ASSAY THYROID STIM HORMONE: CPT | Mod: HCNC | Performed by: FAMILY MEDICINE

## 2024-11-21 PROCEDURE — 1160F RVW MEDS BY RX/DR IN RCRD: CPT | Mod: HCNC,CPTII,S$GLB, | Performed by: FAMILY MEDICINE

## 2024-11-21 PROCEDURE — 1159F MED LIST DOCD IN RCRD: CPT | Mod: HCNC,CPTII,S$GLB, | Performed by: FAMILY MEDICINE

## 2024-11-21 PROCEDURE — 84439 ASSAY OF FREE THYROXINE: CPT | Mod: HCNC | Performed by: FAMILY MEDICINE

## 2024-11-21 PROCEDURE — 1126F AMNT PAIN NOTED NONE PRSNT: CPT | Mod: HCNC,CPTII,S$GLB, | Performed by: FAMILY MEDICINE

## 2024-11-21 PROCEDURE — 1101F PT FALLS ASSESS-DOCD LE1/YR: CPT | Mod: HCNC,CPTII,S$GLB, | Performed by: FAMILY MEDICINE

## 2024-11-21 PROCEDURE — 99999 PR PBB SHADOW E&M-EST. PATIENT-LVL IV: CPT | Mod: PBBFAC,HCNC,, | Performed by: FAMILY MEDICINE

## 2024-11-21 PROCEDURE — 3288F FALL RISK ASSESSMENT DOCD: CPT | Mod: HCNC,CPTII,S$GLB, | Performed by: FAMILY MEDICINE

## 2024-11-21 PROCEDURE — 86376 MICROSOMAL ANTIBODY EACH: CPT | Mod: HCNC | Performed by: FAMILY MEDICINE

## 2024-11-21 PROCEDURE — G2211 COMPLEX E/M VISIT ADD ON: HCPCS | Mod: HCNC,S$GLB,, | Performed by: FAMILY MEDICINE

## 2024-11-21 PROCEDURE — 99214 OFFICE O/P EST MOD 30 MIN: CPT | Mod: HCNC,S$GLB,, | Performed by: FAMILY MEDICINE

## 2024-11-21 RX ORDER — SUVOREXANT 10 MG/1
10 TABLET, FILM COATED ORAL NIGHTLY
Qty: 30 TABLET | Refills: 0 | Status: SHIPPED | OUTPATIENT
Start: 2024-11-21

## 2024-11-22 LAB — THYROPEROXIDASE IGG SERPL-ACNC: <6 IU/ML

## 2024-11-29 DIAGNOSIS — E05.90 SUBCLINICAL HYPERTHYROIDISM: Primary | ICD-10-CM

## 2024-11-30 PROBLEM — I70.0 AORTIC ATHEROSCLEROSIS: Chronic | Status: ACTIVE | Noted: 2024-11-30

## 2024-12-01 NOTE — PROGRESS NOTES
Patient Name: Debbie Earl    : 1935  MRN: [id number]      Subjective:     Patient ID: Debbie is a 88 y.o. female    Chief Complaint:  Follow-up    History of Present Illness    Debbie presents today for a regular follow-up and follow-up on blood pressure and medications.    She was diagnosed with interstitial lung disease following a bronchoscopy. Initially, she experienced fluid in her lungs, which has since improved with prednisone treatment. Her pulmonary specialist noted high pressure in her lungs, likely related to the interstitial lung disease. While prednisone has led to improvement in her condition, it has also caused side effects, including insomnia and facial puffiness.    She reports experiencing insomnia, which she attributes to prednisone. She denies sleeping at night but mentions occasionally dozing off during the day. The sleep disturbances have exacerbated her anxiety. Her sleep patterns were better prior to starting prednisone therapy. Mirtazapine was previously helping with sleep before the addition of prednisone. Balsamra has been newly prescribed for sleep issues, to be taken 30 minutes before bed without food, avoiding activities for 7 hours after taking it.    She reports having an echocardiogram in March and an EKG last month with Dr. Paul. She was informed of some cholesterol buildup in her aorta. She denies being started on cholesterol medication.    She reports ongoing balance and gait issues with a history of related accidents.    Current medications include furosemide 20 mg daily every other day, hydralazine 100 mg twice daily, mirtazapine 7.5 mg nightly for sleep and depression, eversaltan hydrochlorothiazide 160/12.5 mg daily, aspirin 81 mg daily, and prednisone. She is to continue mirtazapine for depression management.    She reports stable kidney function over the past 10 months and denies being anemic.      ROS:  General: -fever, -chills, -fatigue, -weight gain, -weight  loss  Eyes: -vision changes, -redness, -discharge  ENT: -ear pain, -nasal congestion, -sore throat  Cardiovascular: -chest pain, -palpitations, -lower extremity edema  Respiratory: -cough, -shortness of breath  Gastrointestinal: -abdominal pain, -nausea, -vomiting, -diarrhea, -constipation, -blood in stool  Genitourinary: -dysuria, -hematuria, -frequency  Musculoskeletal: -joint pain, -muscle pain  Skin: -rash, -lesion  Neurological: -headache, -dizziness, -numbness, -tingling  Psychiatric: +anxiety, -depression, +sleep difficulty         Review of Systems     Objective:   /64 (BP Location: Left arm, Patient Position: Sitting)   Pulse 69   Resp 18   Ht 5' (1.524 m)   Wt 62.6 kg (138 lb 0.1 oz)   SpO2 (!) 88%   BMI 26.95 kg/m²     Physical Exam     Physical Exam    General: No acute distress. Well-developed. Well-nourished.  Eyes: EOMI. Sclerae anicteric.  HENT: Normocephalic. Atraumatic. Nares patent. Moist oral mucosa.  Ears: Bilateral TMs clear. Bilateral EACs clear.  Cardiovascular: Tachycardia. Regular rhythm. No murmurs. No rubs. No gallops. Normal S1, S2.  Respiratory: Normal respiratory effort. Clear to auscultation bilaterally. No rales. No rhonchi. No wheezing.  Abdomen: Soft. Non-tender. Non-distended. Normoactive bowel sounds.  Musculoskeletal: No  obvious deformity.  Extremities: No lower extremity edema.  Neurological: Alert & oriented x3. No slurred speech. Normal gait.  Psychiatric: Normal mood. Normal affect. Good insight. Good judgment.  Skin: Warm. Dry. No rash.         Assessment        ICD-10-CM ICD-9-CM   1. Essential hypertension, benign  I10 401.1   2. Thyroid enlargement  E04.9 240.9   3. Stage 3a chronic kidney disease  N18.31 585.3   4. Aortic atherosclerosis (CT Chest 9-)  I70.0 440.0   5. Psychophysiological insomnia  F51.04 307.42         Plan:   Assessment & Plan    IMPRESSION:  Evaluated recent addition of prednisone and furosemide to medication regimen  Considered  cholesterol medication for aortic build-up, but decided against due to lack of evidence of benefit at patient's age  Assessed sleep issues likely due to prednisone side effects  Reviewed recent echocardiogram showing elevated lung pressure, potentially related to interstitial lung disease  Evaluated kidney function, noting stability over past 10 months  Ordered thyroid labs and sonogram to investigate potential thyroid issues  Considered anxiety and depression symptoms in context of prednisone use and sleep disturbances    HYPERTENSION:  Continued Furosemide 20 mg daily every other day.  Continued Hydralazine 100 mg twice daily.  Continued Valsartan/Hydrochlorothiazide 160/12.5 mg daily.    THYROID EVALUATION:  Thyroid labs ordered.  Thyroid sonogram ordered.    CHRONIC KIDNEY DISEASE:   Renal function appears stable.    Continue good hydration.    Continue monitoring renal function.    AORTIC ATHEROSCLEROSIS:  Continue blood pressure control and continue aspirin.    INSOMNIA:  Started Balsamra 10 mg daily, to be taken 30 minutes before bedtime without food.  Explained the delayed effect of sleep medication when taken with food.  Debbie to avoid eating food when taking the new sleep medication.  Debbie to not engage in any activities for 7 hours after taking the new sleep medication.    FOLLOW-UP:  Follow up in 2 weeks via patient portal message to report on the effectiveness of Balsamra.  Contact the office if insurance does not cover Balsamra.  Follow up on Saturday, December 14th at 8:45 AM for thyroid sonogram as scheduled.           1. Essential hypertension, benign  -     Comprehensive Metabolic Panel; Future; Expected date: 05/21/2025  -     CBC Auto Differential; Future; Expected date: 05/21/2025    2. Thyroid enlargement  -     US Thyroid; Future; Expected date: 11/21/2024  -     TSH; Future; Expected date: 11/21/2024  -     T4, Free; Future; Expected date: 11/21/2024  -     Thyroid Peroxidase Antibody;  Future; Expected date: 11/21/2024    3. Stage 3a chronic kidney disease  Overview:  Lab Results   Component Value Date    EGFRNORACEVR 48.3 (A) 11/16/2024    EGFRNORACEVR 48 (A) 05/16/2024    EGFRNORACEVR 48 (A) 01/11/2024      Lab Results   Component Value Date    CREATININE 1.1 11/16/2024    CREATININE 1.1 05/16/2024    CREATININE 1.1 01/11/2024      Lab Results   Component Value Date    MICALBCREAT Unable to calculate 06/09/2022    MICALBCREAT 9.8 12/03/2021      Lab Results   Component Value Date    UTPCR 0.14 05/16/2024    UTPCR Unable to calculate 06/19/2023    UTPCR Unable to calculate 11/12/2020         4. Aortic atherosclerosis (CT Chest 9-)    5. Psychophysiological insomnia  -     suvorexant (BELSOMRA) 10 mg Tab; Take 10 mg by mouth every evening. Take within 30 minutes of bedtime without food  Dispense: 30 tablet; Refill: 0             -Vasiliy Oneill Jr., MD, AAHIVS      This note was generated with the assistance of ambient listening technology. Verbal consent was obtained by the patient and accompanying visitor(s) for the recording of patient appointment to facilitate this note. I attest to having reviewed and edited the generated note for accuracy, though some syntax or spelling errors may persist. Please contact the author of this note for any clarification.      There are no Patient Instructions on file for this visit.      Follow up in about 6 months (around 5/21/2025) for Chronic Problems (labs a week prior).   Future Appointments   Date Time Provider Department Center   12/14/2024  8:45 AM [facility] ROOM 2 [facility]   1/27/2025  8:45 AM INJECTION, [facility] INFUSION [facility] CHEMO [facility]   5/17/2025  9:00 AM LAB, [facility] [place]   5/21/2025  9:20 AM Vasiliy Oneill Jr., MD [facility]

## 2024-12-07 ENCOUNTER — PATIENT MESSAGE (OUTPATIENT)
Dept: FAMILY MEDICINE | Facility: CLINIC | Age: 89
End: 2024-12-07
Payer: MEDICARE

## 2024-12-07 DIAGNOSIS — Z76.89 ENCOUNTER FOR NAIL CARE: Primary | ICD-10-CM

## 2024-12-14 ENCOUNTER — HOSPITAL ENCOUNTER (OUTPATIENT)
Dept: RADIOLOGY | Facility: HOSPITAL | Age: 89
Discharge: HOME | End: 2024-12-14
Attending: FAMILY MEDICINE
Payer: MEDICARE

## 2024-12-14 DIAGNOSIS — E04.9 THYROID ENLARGEMENT: ICD-10-CM

## 2024-12-14 PROCEDURE — 76536 US EXAM OF HEAD AND NECK: CPT | Mod: TC,HCNC

## 2024-12-14 PROCEDURE — 76536 US EXAM OF HEAD AND NECK: CPT | Mod: 26,HCNC,, | Performed by: RADIOLOGY

## 2024-12-30 ENCOUNTER — TELEPHONE (OUTPATIENT)
Dept: FAMILY MEDICINE | Facility: CLINIC | Age: 89
End: 2024-12-30
Payer: MEDICARE

## 2024-12-30 NOTE — TELEPHONE ENCOUNTER
----- Message from Vasiliy Oneill MD sent at 12/29/2024 10:51 PM CST -----  Please call patient and schedule appointment with me within the next two weeks, to discuss thyroid ultrasound.  May override an urgent slot for this

## 2025-01-13 ENCOUNTER — OFFICE VISIT (OUTPATIENT)
Dept: FAMILY MEDICINE | Facility: CLINIC | Age: OVER 89
End: 2025-01-13
Payer: MEDICARE

## 2025-01-13 VITALS
HEART RATE: 74 BPM | OXYGEN SATURATION: 82 % | TEMPERATURE: 98 F | DIASTOLIC BLOOD PRESSURE: 68 MMHG | HEIGHT: 60 IN | BODY MASS INDEX: 27.79 KG/M2 | SYSTOLIC BLOOD PRESSURE: 122 MMHG | RESPIRATION RATE: 18 BRPM | WEIGHT: 141.56 LBS

## 2025-01-13 DIAGNOSIS — R05.9 COUGH, UNSPECIFIED TYPE: Primary | ICD-10-CM

## 2025-01-13 DIAGNOSIS — I10 ESSENTIAL HYPERTENSION, BENIGN: Chronic | ICD-10-CM

## 2025-01-13 DIAGNOSIS — E04.2 MULTIPLE THYROID NODULES: Chronic | ICD-10-CM

## 2025-01-13 DIAGNOSIS — I27.20 PULMONARY HYPERTENSION: Chronic | ICD-10-CM

## 2025-01-13 DIAGNOSIS — N18.31 STAGE 3A CHRONIC KIDNEY DISEASE: Chronic | ICD-10-CM

## 2025-01-13 DIAGNOSIS — I70.0 AORTIC ATHEROSCLEROSIS: Chronic | ICD-10-CM

## 2025-01-13 PROCEDURE — 1159F MED LIST DOCD IN RCRD: CPT | Mod: HCNC,CPTII,S$GLB, | Performed by: FAMILY MEDICINE

## 2025-01-13 PROCEDURE — 99999 PR PBB SHADOW E&M-EST. PATIENT-LVL V: CPT | Mod: PBBFAC,HCNC,, | Performed by: FAMILY MEDICINE

## 2025-01-13 PROCEDURE — 1126F AMNT PAIN NOTED NONE PRSNT: CPT | Mod: HCNC,CPTII,S$GLB, | Performed by: FAMILY MEDICINE

## 2025-01-13 PROCEDURE — 3288F FALL RISK ASSESSMENT DOCD: CPT | Mod: HCNC,CPTII,S$GLB, | Performed by: FAMILY MEDICINE

## 2025-01-13 PROCEDURE — 1101F PT FALLS ASSESS-DOCD LE1/YR: CPT | Mod: HCNC,CPTII,S$GLB, | Performed by: FAMILY MEDICINE

## 2025-01-13 PROCEDURE — G2211 COMPLEX E/M VISIT ADD ON: HCPCS | Mod: HCNC,S$GLB,, | Performed by: FAMILY MEDICINE

## 2025-01-13 PROCEDURE — 99214 OFFICE O/P EST MOD 30 MIN: CPT | Mod: HCNC,S$GLB,, | Performed by: FAMILY MEDICINE

## 2025-01-13 PROCEDURE — 1160F RVW MEDS BY RX/DR IN RCRD: CPT | Mod: HCNC,CPTII,S$GLB, | Performed by: FAMILY MEDICINE

## 2025-01-13 NOTE — PROGRESS NOTES
Patient Name: Debbie Earl    : 1935  MRN: [id number]      Subjective:     Patient ID: Debbie is a 89 y.o. female    Chief Complaint:  Results    History of Present Illness    Debbie presents today to discuss thyroid ultrasound results and persistent cough.    She reports a persistent cough for over one year, sometimes productive with foul-tasting phlegm. The symptoms are not associated with cold or flu.    Ultrasound showed multiple thyroid nodules, with nodules in the right side and center requiring biopsy. Thyroid function tests were abnormal. She denies any history of thyroid hormone therapy.    She reports normal blood pressure readings at home.      ROS:  General: -fever, -chills, -fatigue, -weight gain, -weight loss  Eyes: -vision changes, -redness, -discharge  ENT: -ear pain, -nasal congestion, -sore throat  Cardiovascular: -chest pain, -palpitations, -lower extremity edema  Respiratory: +cough, -shortness of breath  Gastrointestinal: -abdominal pain, -nausea, -vomiting, -diarrhea, -constipation, -blood in stool  Genitourinary: -dysuria, -hematuria, -frequency  Musculoskeletal: -joint pain, -muscle pain  Skin: -rash, -lesion  Neurological: -headache, -dizziness, -numbness, -tingling  Psychiatric: -anxiety, -depression, -sleep difficulty         Objective:   /68 (BP Location: Right arm, Patient Position: Sitting)   Pulse 74   Temp 98 °F (36.7 °C) (Oral)   Resp 18   Ht 5' (1.524 m)   Wt 64.2 kg (141 lb 8.6 oz)   SpO2 (!) 82%   BMI 27.64 kg/m²     Physical Exam  Vitals reviewed.   Constitutional:       General: She is not in acute distress.  HENT:      Head: Normocephalic and atraumatic.      Right Ear: Ear canal and external ear normal.      Left Ear: Ear canal and external ear normal.      Nose: Nose normal.      Mouth/Throat:      Mouth: Mucous membranes are moist.      Pharynx: No oropharyngeal exudate or posterior oropharyngeal erythema.   Eyes:      Extraocular Movements: Extraocular  movements intact.      Conjunctiva/sclera: Conjunctivae normal.      Pupils: Pupils are equal, round, and reactive to light.   Cardiovascular:      Rate and Rhythm: Normal rate and regular rhythm.   Pulmonary:      Effort: Pulmonary effort is normal. No respiratory distress.      Breath sounds: No wheezing, rhonchi or rales.   Abdominal:      General: Abdomen is flat. Bowel sounds are normal. There is no distension.      Palpations: Abdomen is soft.      Tenderness: There is no abdominal tenderness. There is no guarding.   Musculoskeletal:      Cervical back: Normal range of motion. No rigidity or tenderness.   Lymphadenopathy:      Cervical: No cervical adenopathy.   Skin:     General: Skin is warm.      Capillary Refill: Capillary refill takes less than 2 seconds.   Neurological:      Mental Status: She is alert and oriented to person, place, and time.      Cranial Nerves: No cranial nerve deficit.      Sensory: No sensory deficit.   Psychiatric:         Mood and Affect: Mood normal.         Behavior: Behavior normal.          Narrative & Impression  EXAMINATION:  US THYROID     CLINICAL HISTORY:  Nontoxic goiter, unspecified     TECHNIQUE:  Ultrasound of the thyroid and cervical lymph nodes was performed.     COMPARISON:  None.     FINDINGS:  Right lobe of the thyroid measures 5.0 x 4.2 x 3.2 cm.  Left lobe of the thyroid measures 4.9 x 2.3 x 1.8 cm.  Thyroid demonstrates normal background parenchyma with increased perfusion suggestive of thyroiditis.     Solid hypoechoic nodule in the superior pole measures 1.4 x 1.3 x 1.2 cm consistent with a TR 4 nodule which meets criteria for follow-up ultrasound in 1 year.     1.5 x 1.3 x 1.3 cm isoechoic nodule in the inferior pole consistent with a TR 3 nodule meeting criteria for follow-up ultrasound in 1 year.     Hypoechoic solid-appearing nodule measuring 2.4 x 1.7 x 1.8 cm consistent with a TR 4 nodule meeting criteria for fine-needle aspiration in the inferior pole  of the right lobe.     Heterogeneous solid hypoechoic nodule in the isthmus measures 2.4 x 0.9 x 1.7 cm consistent with a TR 4 nodule which meets criteria for fine-needle aspiration.     No thyroid nodules within the left lobe.     A few normal appearing lymph nodes.     Impression:     Two TR 4 nodules in the right lobe of the thyroid and isthmus which meet criteria for fine-needle aspiration.  No suspicious lymph nodes.     Remaining nodules meet criteria for follow-up ultrasound in 1 year.     This report was flagged in Epic as abnormal.        Electronically signed by:Jefry Segura MD  Date:                                            12/14/2024  Time:                                           10:47    No visits with results within 1 Month(s) from this visit.   Latest known visit with results is:   Lab Visit on 11/21/2024   Component Date Value Ref Range Status    TSH 11/21/2024 0.290 (L)  0.400 - 4.000 uIU/mL Final    Free T4 11/21/2024 1.14  0.71 - 1.51 ng/dL Final    Thyroperoxidase Antibodies 11/21/2024 <6.0  <6.0 IU/mL Final        Assessment        ICD-10-CM ICD-9-CM   1. Cough, unspecified type  R05.9 786.2   2. Multiple thyroid nodules  E04.2 241.1   3. Essential hypertension, benign  I10 401.1   4. Aortic atherosclerosis (CT Chest 9-)  I70.0 440.0   5. Pulmonary hypertension  I27.20 416.8   6. Stage 3a chronic kidney disease  N18.31 585.3         Plan:   Assessment & Plan    IMPRESSION:  Thyroid ultrasound revealed multiple nodules; two nodules (1 on right side, 1 in center) require further evaluation  Recommend fine needle aspiration biopsy of concerning thyroid nodules due to patient's persistent cough for over a year  Considered risks of biopsy procedure, including bleeding and discomfort  Reviewed abnormal thyroid lab results, necessitating endocrinology consultation  Considered virtual endocrinology consult to expedite recommendations    PERSISTENT COUGH:  Noted that the patient has had a  persistent cough for more than a year, sometimes accompanied by phlegm.  The cough is not related to cold or flu.  Explained the potential relationship between thyroid issues and persistent cough.  Will continue to monitor the cough and its potential relation to thyroid nodules.  Recommended evaluation with ENT.    THYROID NODULES:  Explained the nature of thyroid nodules and potential implications to the patient.  Ultrasound shows multiple nodules, with 2 nodules of concern on the right side and center requiring further investigation.  Ordered fine needle aspiration biopsy of these concerning nodules and described the procedure, including duration and patient experience.  Discussed the importance of timely evaluation to rule out serious conditions like cancer.  Requested virtual endocrinology consultation for thyroid management.  Instructed the patient to contact the office if not contacted within 10 days to schedule interventional radiology for thyroid biopsy    HYPERTENSION:  Continue current blood pressure regimen.    AORTIC ATHEROSCLEROSIS:  Continue blood pressure management and aspirin.    PULMONARY HYPERTENSION:  Continue medical management as per Cardiology.    CKD:  Continue good hydration.    Continue avoiding nephrotoxic medications including nonsteroidal anti-inflammatories.        1. Cough, unspecified type  -     Ambulatory referral/consult to ENT; Future; Expected date: 01/20/2025    2. Multiple thyroid nodules  Overview:  TSH   Date Value Ref Range Status   11/21/2024 0.290 (L) 0.400 - 4.000 uIU/mL Final   06/10/2023 3.475 0.400 - 4.000 uIU/mL Final   12/09/2022 5.645 (H) 0.400 - 4.000 uIU/mL Final     Free T4   Date Value Ref Range Status   11/21/2024 1.14 0.71 - 1.51 ng/dL Final   12/09/2022 0.85 0.71 - 1.51 ng/dL Final   03/03/2021 0.92 0.71 - 1.51 ng/dL Final      Thyroperoxidase Antibodies   Date Value Ref Range Status   11/21/2024 <6.0 <6.0 IU/mL Final     12-  US Thyroid  Right lobe of  the thyroid measures 5.0 x 4.2 x 3.2 cm.  Left lobe of the thyroid measures 4.9 x 2.3 x 1.8 cm.  Thyroid demonstrates normal background parenchyma with increased perfusion suggestive of thyroiditis.    Solid hypoechoic nodule in the superior pole measures 1.4 x 1.3 x 1.2 cm consistent with a TR 4 nodule which meets criteria for follow-up ultrasound in 1 year.    1.5 x 1.3 x 1.3 cm isoechoic nodule in the inferior pole consistent with a TR 3 nodule meeting criteria for follow-up ultrasound in 1 year.    Hypoechoic solid-appearing nodule measuring 2.4 x 1.7 x 1.8 cm consistent with a TR 4 nodule meeting criteria for fine-needle aspiration in the inferior pole of the right lobe.    Heterogeneous solid hypoechoic nodule in the isthmus measures 2.4 x 0.9 x 1.7 cm consistent with a TR 4 nodule which meets criteria for fine-needle aspiration.    No thyroid nodules within the left lobe.    Orders:  -     T4, Free; Future; Expected date: 01/13/2025  -     T3; Future; Expected date: 01/13/2025  -     THYROTROPIN RECEPTOR ANTIBODY; Future; Expected date: 01/13/2025  -     Ultra-sensitive TSH; Future; Expected date: 01/13/2025  -     Ambulatory referral/consult to Interventional Radiology; Future; Expected date: 01/20/2025  -     IR US FNA Biopsy, 1st Lesion; Future; Expected date: 01/13/2025  -     IR US FNA Biopsy, Ea Addl Lesion; Future; Expected date: 01/13/2025    3. Essential hypertension, benign    4. Aortic atherosclerosis (CT Chest 9-)    5. Pulmonary hypertension  Overview:  03-  Echocardiogram  RVSP ~ 55.4 mmHg.     Cardiologist-  Dr. Paul at [facility] [place] [facility]      6. Stage 3a chronic kidney disease  Overview:  Lab Results   Component Value Date    EGFRNORACEVR 48.3 (A) 11/16/2024    EGFRNORACEVR 48 (A) 05/16/2024    EGFRNORACEVR 48 (A) 01/11/2024      Lab Results   Component Value Date    CREATININE 1.1 11/16/2024    CREATININE 1.1 05/16/2024    CREATININE 1.1 01/11/2024      Lab Results   Component Value Date     MICALBCREAT Unable to calculate 06/09/2022    MICALBCREAT 9.8 12/03/2021      Lab Results   Component Value Date    UTPCR 0.14 05/16/2024    UTPCR Unable to calculate 06/19/2023    UTPCR Unable to calculate 11/12/2020                  -Vasiliy Oneill Jr., MD, AAHIVS      This note was generated with the assistance of ambient listening technology. Verbal consent was obtained by the patient and accompanying visitor(s) for the recording of patient appointment to facilitate this note. I attest to having reviewed and edited the generated note for accuracy, though some syntax or spelling errors may persist. Please contact the author of this note for any clarification.      There are no Patient Instructions on file for this visit.      No follow-ups on file.   Future Appointments   Date Time Provider Department Center   1/27/2025  8:45 AM INJECTION, [facility] INFUSION [facility] CHEMO [facility]   1/27/2025  9:20 AM LAB,  HOSPITAL [facility] LAB [facility]   2/3/2025 10:00 AM Pinky Orr DPM [facility] POD [place]   2/20/2025  9:15 AM Delilah Seth MD [facility] ENT [facility]   4/4/2025  2:30 PM Romero Decker MD [facility] ENDOCRN [facility]   5/17/2025  9:00 AM LAB, [facility] LAB [place]   5/21/2025  9:20 AM Vasiliy Oneill Jr., MD [facility] -

## 2025-01-25 DIAGNOSIS — F41.9 ANXIETY: Chronic | ICD-10-CM

## 2025-01-25 DIAGNOSIS — F51.04 PSYCHOPHYSIOLOGICAL INSOMNIA: Chronic | ICD-10-CM

## 2025-01-25 RX ORDER — MIRTAZAPINE 7.5 MG/1
7.5 TABLET, FILM COATED ORAL NIGHTLY
Qty: 90 TABLET | Refills: 3 | Status: SHIPPED | OUTPATIENT
Start: 2025-01-25

## 2025-01-25 NOTE — TELEPHONE ENCOUNTER
Refill Decision Note   Debbie Earl  is requesting a refill authorization.  Brief Assessment and Rationale for Refill:  Approve     Medication Therapy Plan:        Comments:     Note composed:1:05 PM 01/25/2025

## 2025-01-25 NOTE — TELEPHONE ENCOUNTER
No care due was identified.  [facility] Embedded Care Due Messages. Reference number: [id number].   1/25/2025 2:15:59 AM CST

## 2025-01-27 ENCOUNTER — LAB VISIT (OUTPATIENT)
Dept: LAB | Facility: HOSPITAL | Age: OVER 89
End: 2025-01-27
Attending: FAMILY MEDICINE
Payer: MEDICARE

## 2025-01-27 ENCOUNTER — INFUSION (OUTPATIENT)
Dept: INFUSION THERAPY | Facility: HOSPITAL | Age: OVER 89
End: 2025-01-27
Attending: FAMILY MEDICINE
Payer: MEDICARE

## 2025-01-27 VITALS
HEART RATE: 98 BPM | SYSTOLIC BLOOD PRESSURE: 157 MMHG | RESPIRATION RATE: 18 BRPM | DIASTOLIC BLOOD PRESSURE: 69 MMHG | TEMPERATURE: 99 F | OXYGEN SATURATION: 95 %

## 2025-01-27 DIAGNOSIS — M85.80 OSTEOPENIA WITH HIGH RISK OF FRACTURE: Primary | ICD-10-CM

## 2025-01-27 DIAGNOSIS — E04.2 MULTIPLE THYROID NODULES: Chronic | ICD-10-CM

## 2025-01-27 LAB
T3 SERPL-MCNC: 91 NG/DL (ref 60–180)
T4 FREE SERPL-MCNC: 0.97 NG/DL (ref 0.71–1.51)

## 2025-01-27 PROCEDURE — 96372 THER/PROPH/DIAG INJ SC/IM: CPT | Mod: HCNC

## 2025-01-27 PROCEDURE — 84480 ASSAY TRIIODOTHYRONINE (T3): CPT | Mod: HCNC | Performed by: FAMILY MEDICINE

## 2025-01-27 PROCEDURE — 63600175 PHARM REV CODE 636 W HCPCS: Mod: JZ,TB,HCNC | Performed by: FAMILY MEDICINE

## 2025-01-27 PROCEDURE — 36415 COLL VENOUS BLD VENIPUNCTURE: CPT | Mod: HCNC | Performed by: FAMILY MEDICINE

## 2025-01-27 PROCEDURE — 84443 ASSAY THYROID STIM HORMONE: CPT | Mod: HCNC | Performed by: FAMILY MEDICINE

## 2025-01-27 PROCEDURE — 84439 ASSAY OF FREE THYROXINE: CPT | Mod: HCNC | Performed by: FAMILY MEDICINE

## 2025-01-27 PROCEDURE — 83520 IMMUNOASSAY QUANT NOS NONAB: CPT | Mod: HCNC | Performed by: FAMILY MEDICINE

## 2025-01-27 RX ADMIN — DENOSUMAB 60 MG: 60 INJECTION SUBCUTANEOUS at 08:01

## 2025-01-27 NOTE — PLAN OF CARE
Arrived on unit with daughter. Prolia SQ L arm given. Tolerated well. Appts printed for patient. All questions and concerns addressed. Left per self in NAD.

## 2025-01-30 DIAGNOSIS — Z00.00 ENCOUNTER FOR MEDICARE ANNUAL WELLNESS EXAM: ICD-10-CM

## 2025-01-30 LAB
TSH RECEP AB SER-ACNC: <1.1 IU/L (ref 0–1.75)
TSH SERPL DL<=0.05 MIU/L-ACNC: 3.1 MIU/L (ref 0.3–4.2)

## 2025-02-03 ENCOUNTER — OFFICE VISIT (OUTPATIENT)
Dept: PODIATRY | Facility: CLINIC | Age: OVER 89
End: 2025-02-03
Payer: MEDICARE

## 2025-02-03 VITALS — HEIGHT: 60 IN | WEIGHT: 141.56 LBS | BODY MASS INDEX: 27.79 KG/M2

## 2025-02-03 DIAGNOSIS — G60.9 IDIOPATHIC PERIPHERAL NEUROPATHY: ICD-10-CM

## 2025-02-03 DIAGNOSIS — Z76.89 ENCOUNTER FOR NAIL CARE: ICD-10-CM

## 2025-02-03 DIAGNOSIS — L60.3 ONYCHODYSTROPHY: ICD-10-CM

## 2025-02-03 DIAGNOSIS — M79.672 PAIN IN BOTH FEET: Primary | ICD-10-CM

## 2025-02-03 DIAGNOSIS — M79.671 PAIN IN BOTH FEET: Primary | ICD-10-CM

## 2025-02-03 PROCEDURE — 99203 OFFICE O/P NEW LOW 30 MIN: CPT | Mod: HCNC,S$GLB,, | Performed by: PODIATRIST

## 2025-02-03 PROCEDURE — 3288F FALL RISK ASSESSMENT DOCD: CPT | Mod: HCNC,CPTII,S$GLB, | Performed by: PODIATRIST

## 2025-02-03 PROCEDURE — 1101F PT FALLS ASSESS-DOCD LE1/YR: CPT | Mod: HCNC,CPTII,S$GLB, | Performed by: PODIATRIST

## 2025-02-03 PROCEDURE — 1159F MED LIST DOCD IN RCRD: CPT | Mod: HCNC,CPTII,S$GLB, | Performed by: PODIATRIST

## 2025-02-03 PROCEDURE — 1126F AMNT PAIN NOTED NONE PRSNT: CPT | Mod: HCNC,CPTII,S$GLB, | Performed by: PODIATRIST

## 2025-02-03 PROCEDURE — 99999 PR PBB SHADOW E&M-EST. PATIENT-LVL IV: CPT | Mod: PBBFAC,HCNC,, | Performed by: PODIATRIST

## 2025-02-03 RX ORDER — DICLOFENAC SODIUM 10 MG/G
2 GEL TOPICAL DAILY
Qty: 450 G | Refills: 3 | Status: SHIPPED | OUTPATIENT
Start: 2025-02-03

## 2025-02-03 NOTE — PROGRESS NOTES
Subjective:     Patient ID: Debbie Earl is a 89 y.o. female.    Chief Complaint: Nail Problem and Numbness (B/l)    Debbie is a 89 y.o. female who presents to the podiatry clinic  with complaint of  bilateral foot pain. Onset of the symptoms was several months ago. Precipitating event: none known. Current symptoms include: ability to bear weight, but with some pain. Aggravating factors: any weight bearing. Symptoms have progressed to a point and plateaued. Patient has had no prior foot problems. Evaluation to date: none. Treatment to date: none.     Review of Systems   Constitutional: Negative for chills.   Cardiovascular:  Negative for chest pain and claudication.   Respiratory:  Negative for cough.    Skin:  Positive for color change, dry skin and nail changes.   Musculoskeletal:  Positive for joint pain.   Gastrointestinal:  Negative for nausea.   Neurological:  Positive for paresthesias. Negative for numbness.   Psychiatric/Behavioral:  The patient is not nervous/anxious.         Objective:     Physical Exam  Constitutional:       Appearance: She is well-developed.      Comments: Oriented to time, place, and person.   Cardiovascular:      Comments: DP and PT pulses are palpable bilaterally. 3 sec capillary refill time and toes and feet are warm to touch proximally .  There is  hair growth on the feet and toes b/l. There is no edema b/l. No spider veins or varicosities present b/l.     Musculoskeletal:      Comments: Equinus noted b/l ankles with < 10 deg DF noted. MMT 5/5 in DF/PF/Inv/Ev resistance with no reproduction of pain in any direction. Passive range of motion of ankle and pedal joints is painless b/l.     Feet:      Right foot:      Skin integrity: No callus or dry skin.      Left foot:      Skin integrity: No callus or dry skin.   Lymphadenopathy:      Comments: Negative lymphadenopathy bilateral popliteal fossa and tarsal tunnel.   Skin:     Comments: No open lesions, lacerations or wounds  noted.Interdigital spaces clean, dry and intact b/l. No erythema noted to b/l foot.  Nails normal color and trophic qualities.     Neurological:      Mental Status: She is alert.      Comments: Light touch, proprioception, and sharp/dull sensation are all intact bilaterally. Protective threshold with the Pembroke-Wienstein monofilament is intact bilaterally.      Subjective paresthesias with no clearly identifiable source or trigger.        Psychiatric:         Behavior: Behavior is cooperative.           Assessment:      Encounter Diagnoses   Name Primary?    Encounter for nail care     Onychodystrophy     Idiopathic peripheral neuropathy     Pain in both feet Yes     Plan:     Debbie was seen today for nail problem and numbness.    Diagnoses and all orders for this visit:    Pain in both feet  -     Ankle Brachial Indices (FIDELIA); Future    Encounter for nail care  -     Ambulatory referral/consult to Podiatry    Onychodystrophy  -     Ambulatory referral/consult to Podiatry    Idiopathic peripheral neuropathy  -     Ambulatory referral/consult to Neurology; Future    Other orders  -     diclofenac sodium (VOLTAREN ARTHRITIS PAIN) 1 % Gel; Apply 2 g topically once daily.      I counseled the patient on her conditions, their implications and medical management.      FIDELIA ordered    Rx Voltaren gel to be applied to affected area up to 3-4 x daily as needed for pain    Referral placed to neurology    Discussed conservative treatment with shoes of adequate dimensions, material, and style to alleviate symptoms and delay or prevent surgical intervention.    Nails 1-5 trimmed B/L     RTC PRN

## 2025-02-19 ENCOUNTER — HOSPITAL ENCOUNTER (OUTPATIENT)
Dept: INTERVENTIONAL RADIOLOGY/VASCULAR | Facility: HOSPITAL | Age: OVER 89
Discharge: HOME | End: 2025-02-19
Attending: FAMILY MEDICINE
Payer: MEDICARE

## 2025-02-19 VITALS
HEART RATE: 67 BPM | RESPIRATION RATE: 17 BRPM | OXYGEN SATURATION: 95 % | DIASTOLIC BLOOD PRESSURE: 75 MMHG | SYSTOLIC BLOOD PRESSURE: 173 MMHG

## 2025-02-19 DIAGNOSIS — E04.2 MULTIPLE THYROID NODULES: Chronic | ICD-10-CM

## 2025-02-19 PROCEDURE — 88177 CYTP FNA EVAL EA ADDL: CPT | Mod: 59,HCNC | Performed by: PATHOLOGY

## 2025-02-19 PROCEDURE — 88172 CYTP DX EVAL FNA 1ST EA SITE: CPT | Mod: 59,HCNC | Performed by: PATHOLOGY

## 2025-02-19 PROCEDURE — 63600175 PHARM REV CODE 636 W HCPCS: Mod: HCNC | Performed by: NURSE PRACTITIONER

## 2025-02-19 PROCEDURE — 88173 CYTOPATH EVAL FNA REPORT: CPT | Mod: 59,HCNC | Performed by: PATHOLOGY

## 2025-02-19 RX ORDER — LIDOCAINE HYDROCHLORIDE 10 MG/ML
INJECTION, SOLUTION INFILTRATION; PERINEURAL
Status: COMPLETED | OUTPATIENT
Start: 2025-02-19 | End: 2025-02-19

## 2025-02-19 RX ADMIN — LIDOCAINE HYDROCHLORIDE 8 ML: 10 INJECTION, SOLUTION INFILTRATION; PERINEURAL at 09:02

## 2025-02-19 NOTE — PROCEDURES
Radiology Post-Procedure Note     Pre Op Diagnosis: 1. Suspicious isthmus thyroid nodule  2. Suspicious right lower pole thyroid nodule  Post Op Diagnosis: Same     Procedure: 1. US-guided percutaneous 25-gauge FNA of the suspicious isthmus thyroid nodule  2. US-guided percutaneous 23-gauge FNA of the suspicious right lower pole thyroid nodule     Procedure performed by: Marilyn Medellin NP     Written Informed Consent Obtained: Yes  Specimen Removed: YES, 25-G FNA x 5 passes isthmus; 23-G FNA x 3 passes RLP  Estimated Blood Loss: minimal  Specimen handed to pathology who determined sample was adequate.      Findings:   Successful US-guided percutaneous 25-gauge FNA of the suspicious isthmus and right lower pole thyroid nodules with local anesthetic only. Patient tolerated the procedure well. No immediate post-procedural complications noted.      No post-op activity, diet or medication restrictions.    Marilyn Medellin NP  Interventional Radiology

## 2025-02-19 NOTE — DISCHARGE SUMMARY
Radiology Discharge Summary      Hospital Course: No complications    Admit Date: 2/19/2025  Discharge Date: 02/19/2025     Instructions Given to Patient: Yes  Diet: Resume prior diet  Activity: activity as tolerated    Description of Condition on Discharge: Stable  Vital Signs (Most Recent): Pulse: 67 (02/19/25 0934)  Resp: 17 (02/19/25 0934)  BP: (!) 173/75 (02/19/25 0934)  SpO2: 95 % (02/19/25 0934)    Discharge Disposition: Home    Discharge Diagnosis: 88 yo F with multiple thyroid nodules s/p US guided FNA of isthmus and RLP nodules    Follow up: with referring provider as directed    Marilyn Medellin NP  Interventional Radiology

## 2025-02-19 NOTE — PLAN OF CARE
Procedure completed, pt tolerated without s/sx of complication. Bandaids applied CDI. Specimens collected and handed. Discharge instructions reviewed and acknowledged. Pt discharged via ambulation.

## 2025-02-19 NOTE — Clinical Note
Bilateral: Neck.   Scrubbed with Chlorhexidine/Alcohol.    Hair: N/A.  Skin prep dry before draping.  Prepped by: Marilyn Medellin NP 2/19/2025 8:57 AM.

## 2025-02-20 ENCOUNTER — TELEPHONE (OUTPATIENT)
Dept: INTERVENTIONAL RADIOLOGY/VASCULAR | Facility: HOSPITAL | Age: OVER 89
End: 2025-02-20
Payer: MEDICARE

## 2025-02-21 LAB
ADEQUACY: ABNORMAL
ADEQUACY: ABNORMAL
FINAL PATHOLOGIC DIAGNOSIS: ABNORMAL
FINAL PATHOLOGIC DIAGNOSIS: ABNORMAL
Lab: ABNORMAL
Lab: ABNORMAL

## 2025-03-06 ENCOUNTER — HOSPITAL ENCOUNTER (OUTPATIENT)
Dept: CARDIOLOGY | Facility: HOSPITAL | Age: OVER 89
Discharge: HOME | End: 2025-03-06
Attending: PODIATRIST
Payer: MEDICARE

## 2025-03-06 DIAGNOSIS — M79.671 PAIN IN BOTH FEET: ICD-10-CM

## 2025-03-06 DIAGNOSIS — M79.672 PAIN IN BOTH FEET: ICD-10-CM

## 2025-03-06 LAB
LEFT ARM BP: 126 MMHG
LEFT TBI: 0.98
LEFT TOE PRESSURE: 138 MMHG
RIGHT ARM BP: 141 MMHG
RIGHT TBI: 0.84
RIGHT TOE PRESSURE: 119 MMHG

## 2025-03-06 PROCEDURE — 93922 UPR/L XTREMITY ART 2 LEVELS: CPT | Mod: 26,HCNC,, | Performed by: INTERNAL MEDICINE

## 2025-03-06 PROCEDURE — 93922 UPR/L XTREMITY ART 2 LEVELS: CPT | Mod: HCNC

## 2025-03-12 DIAGNOSIS — I10 ESSENTIAL HYPERTENSION, BENIGN: Chronic | ICD-10-CM

## 2025-03-12 NOTE — TELEPHONE ENCOUNTER
Refill Routing Note   Medication(s) are not appropriate for processing by [facility] for the following reason(s):        Required vitals abnormal    [facility] action(s):  Defer               Appointments  past 12m or future 3m with PCP    Date Provider   Last Visit   1/13/2025 Vasiliy Oneill Jr., MD   Next Visit   5/21/2025 Vasiliy Oneill Jr., MD   ED visits in past 90 days: 0        Note composed:12:44 PM 03/12/2025

## 2025-03-12 NOTE — TELEPHONE ENCOUNTER
No care due was identified.  [facility] Embedded Care Due Messages. Reference number: [id number].   3/12/2025 3:43:04 AM CDT

## 2025-03-17 ENCOUNTER — OFFICE VISIT (OUTPATIENT)
Dept: OTOLARYNGOLOGY | Facility: CLINIC | Age: OVER 89
End: 2025-03-17
Payer: MEDICARE

## 2025-03-17 VITALS
BODY MASS INDEX: 27.79 KG/M2 | WEIGHT: 141.56 LBS | SYSTOLIC BLOOD PRESSURE: 146 MMHG | DIASTOLIC BLOOD PRESSURE: 73 MMHG | HEIGHT: 60 IN

## 2025-03-17 DIAGNOSIS — J34.3 HYPERTROPHY OF INFERIOR NASAL TURBINATE: ICD-10-CM

## 2025-03-17 DIAGNOSIS — R05.3 CHRONIC COUGH: ICD-10-CM

## 2025-03-17 DIAGNOSIS — K21.9 LARYNGOPHARYNGEAL REFLUX (LPR): ICD-10-CM

## 2025-03-17 DIAGNOSIS — E04.1 THYROID NODULE: Primary | ICD-10-CM

## 2025-03-17 PROCEDURE — 1159F MED LIST DOCD IN RCRD: CPT | Mod: CPTII,S$GLB,, | Performed by: OTOLARYNGOLOGY

## 2025-03-17 PROCEDURE — 1101F PT FALLS ASSESS-DOCD LE1/YR: CPT | Mod: CPTII,S$GLB,, | Performed by: OTOLARYNGOLOGY

## 2025-03-17 PROCEDURE — 1125F AMNT PAIN NOTED PAIN PRSNT: CPT | Mod: CPTII,S$GLB,, | Performed by: OTOLARYNGOLOGY

## 2025-03-17 PROCEDURE — 99205 OFFICE O/P NEW HI 60 MIN: CPT | Mod: 25,S$GLB,, | Performed by: OTOLARYNGOLOGY

## 2025-03-17 PROCEDURE — 31575 DIAGNOSTIC LARYNGOSCOPY: CPT | Mod: S$GLB,,, | Performed by: OTOLARYNGOLOGY

## 2025-03-17 PROCEDURE — 3288F FALL RISK ASSESSMENT DOCD: CPT | Mod: CPTII,S$GLB,, | Performed by: OTOLARYNGOLOGY

## 2025-03-17 RX ORDER — CHOLECALCIFEROL (VITAMIN D3) 25 MCG
1 TABLET ORAL DAILY
COMMUNITY

## 2025-03-17 RX ORDER — PANTOPRAZOLE SODIUM 40 MG/1
40 TABLET, DELAYED RELEASE ORAL DAILY
Qty: 90 TABLET | Refills: 3 | Status: SHIPPED | OUTPATIENT
Start: 2025-03-17

## 2025-03-17 NOTE — PROGRESS NOTES
"OTOLARYNGOLOGY CLINIC NOTE  Date:  03/17/2025     Chief complaint:  Chief Complaint   Patient presents with    Cough     Clearing throat    Thyroid Problem     nodules       History of Present Illness  Debbie Earl is a 89 y.o. female  presenting today for a new evaluation and treatment of   Cough. Here with martell. Declined ;  daughter and patient wanted daughter to translate  and not to use monico.     Also has thyroid nodules- had ultrasound and FNA completed;  has apptmt with dr martinez in April     No problems with swallowing; No choking with eating or drinking   No issues with voice. No tobacco use   No issues with acid reflux; no seasonal allergy issues that she is aware of  Has been many years regarding the cough no throat pain   When lays on left side or certain positions coughs more. Not waking up coughing once goes to sleep + snoring ; has not been checked for kimmy. Feels well rested.    Gets phlegm mostly at night. + throat clearing and dry cough during the day.   Gets dry mouth. Does get shortness of breath episodes with coughing and walking.    Cardiologist at [facility] referred her to pulm for the sob . 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 Had bronchoscope note per epic "ILD (interstitial lung disease) (CMS/HCC)/ Poss HP   Evidence of interstitial lung dz - not classic for IPF . Increased lymphocytes on BAL - fungal allergy panel + - clinically feels better - reviewed HRCT - see above - she does have a fair degree of air trapping - ? HP   Stopped taking steroids in Nov ( because of side effects) - has been stable - will monitor     RTC in 3 months with CT "        Past Medical History  Past Medical History:   Diagnosis Date    Abnormal EKG     Glaucoma     " Hypercholesterolemia     Hypertension     Nuclear sclerosis of both eyes 2022        Past Surgical History  Past Surgical History:   Procedure Laterality Date     SECTION  10/1980    FRACTURE SURGERY  1988    glaucoma laser  Bilateral     PI     HYSTERECTOMY          Medications  Medications Ordered Prior to Encounter[1]    Review of Systems  Review of Systems   Constitutional: Negative.    HENT: Negative.     Eyes: Negative.    Respiratory:  Positive for cough and shortness of breath.    Gastrointestinal: Negative.    Genitourinary: Negative.    Skin: Negative.    Neurological:  Positive for tremors.   Psychiatric/Behavioral:  The patient is nervous/anxious.     Answers submitted by the patient for this visit:  Review of Symptoms Questionnaire  (Submitted on 3/16/2025)  Foot swelling?: Yes  None of these: Yes  Cold all of the time? : Yes  sleep disturbance: Yes    Social History   reports that she has never smoked. She has never used smokeless tobacco. She reports that she does not drink alcohol and does not use drugs.     Family History  Family History   Problem Relation Name Age of Onset    Cataracts Mother Jennifer White     Hypertension Mother Jennifer White     No Known Problems Father      No Known Problems Sister      No Known Problems Brother      No Known Problems Maternal Aunt      No Known Problems Maternal Uncle      No Known Problems Paternal Aunt      No Known Problems Paternal Uncle      No Known Problems Maternal Grandmother      No Known Problems Maternal Grandfather      No Known Problems Paternal Grandmother      No Known Problems Paternal Grandfather      Diabetes Sister Jennifer     Intellectual disability Brother Rishi     Melanoma Neg Hx          Physical Exam   Vitals:    25 0942   BP: (!) 146/73    Body mass index is 27.64 kg/m².  Weight: 64.2 kg (141 lb 8.6 oz)   Height: 5' (152.4 cm)     GENERAL: no acute distress.  HEAD: normocephalic.   EYES: lids and lashes  normal. No scleral icterus  EARS: external ear without lesion, normal pinna shape and position.  External auditory canal with normal cerumen, tympanic membrane fully visible, no perforation , no retraction. No middle ear effusion. Ossicles intact.   NOSE: external nose without significant bony abnormality  ORAL CAVITY/OROPHARYNX: tongue midline and mobile. Symmetric palate rise. Uvula midline. Tonsils 2+kevin modified mallampati 2-3 +denture  NECK: trachea midline. No thyromegaly  LYMPH NODES:No cervical lymphadenopathy.  RESPIRATORY: no stridor, no stertor. Voice normal. Respirations nonlabored.+throat clearing  NEURO: alert, responds to questions appropriately.   PSYCH:mood appropriate    PROCEDURE NOTE  NAME OF PROCEDURE: Flexible Laryngoscopy, diagnostic  INDICATIONS: gag reflex precludes mirror exam,  cough  FINDINGS: mild turbinate hypertrophy ; normal vocal fold motion ; cobblestoning and pseudosulcus suggestive of LPR    Consent: After procedure was explained in detail and all questions answered, verbal consent was obtained for performing flexible laryngoscopy.  Anesthesia: topical 4% lidocaine and neosynephrine  Procedure: With patient in seated position, the scope was inserted into the bilateral nasal passageway and advanced atraumatically into the nasopharynx to examine the following structures:  Nasal cavity: Turbinates with mild hypertrophy. no middle meatal edema. No purulent drainage.   Nasopharynx: no mass or lesion noted in nasopharynx.   Oropharynx: base of tongue without  mass or ulceration. Lingual tonsils symmetric  Hypopharynx: posterior pharyngeal wall without mass ; +cobblestoning No pooling of secretions. Pyriform sinuses visible without mass or lesion  Larynx: epiglottis normal without lesion. False vocal folds without edema/erythema/lesion. True vocal folds mobile and without lesion. Mild interarytenoid edema no erythema . Postcricoid region with mild edema no lesion  "+pseudosulcus  Subglottis: visualized portion of subglottis normal in appearance    After examination performed, the scope was removed atraumatically . The patient tolerated the procedure well. Photodocumentation obtained with representative images below, all images and/or videos uploaded in media section of epic.                                                              Path   2-19-25    (1) Isthmus nodule  [facility] Thyroid Cytology Category: Atypia Undetermined Significance (AUS)     Other findings and comments:   Lymphocytes present.   thick colloid present     (2) Right lower pole     Abnormal   [facility] Thyroid Cytology Category: Atypia Undetermined Significance (AUS)    Other findings and comments:  Lymphocytes present.       Imaging:  The patient does have any pertinent and/or recent imaging of the head and neck.   Ultrasound thyroid 12-14-24  images and report personally reviewed and reviewed with patient. Radiology report in quotations below  "Right lobe of the thyroid measures 5.0 x 4.2 x 3.2 cm.  Left lobe of the thyroid measures 4.9 x 2.3 x 1.8 cm.  Thyroid demonstrates normal background parenchyma with increased perfusion suggestive of thyroiditis.     Solid hypoechoic nodule in the superior pole measures 1.4 x 1.3 x 1.2 cm consistent with a TR 4 nodule which meets criteria for follow-up ultrasound in 1 year.     1.5 x 1.3 x 1.3 cm isoechoic nodule in the inferior pole consistent with a TR 3 nodule meeting criteria for follow-up ultrasound in 1 year.     Hypoechoic solid-appearing nodule measuring 2.4 x 1.7 x 1.8 cm consistent with a TR 4 nodule meeting criteria for fine-needle aspiration in the inferior pole of the right lobe.     Heterogeneous solid hypoechoic nodule in the isthmus measures 2.4 x 0.9 x 1.7 cm consistent with a TR 4 nodule which meets criteria for fine-needle aspiration.     No thyroid nodules within the left lobe.     A few normal appearing lymph nodes.    " ""        Labs:  CBC  Recent Labs   Lab 01/06/24  0821 05/16/24  1125 11/16/24  0810   WBC 5.94 6.91 12.49   Hemoglobin 13.4 12.2 13.9   Hematocrit 42.2 40.9 41.8   MCV 89 89 97   Platelets 264 294 252     BMP  Recent Labs   Lab 01/11/24  1041 05/16/24  1125 11/16/24  0810   Glucose 90 76 85   Sodium 140 141 143   Potassium 4.0 3.9 3.7   Chloride 102 103 100   CO2 29 26 29   BUN 16 23 22   Creatinine 1.1 1.1 1.1   Calcium 9.7 9.9 9.6   Phosphorus 2.2 L  --   --      COAGS        Assessment  1. Cough, unspecified type  - Ambulatory referral/consult to ENT    2. Thyroid nodule    3. Laryngopharyngeal reflux (LPR)    4. Hypertrophy of inferior nasal turbinate       Plan:  Discussed plan of care with patient in detail and all questions answered. Patient reported understanding of plan of care. I gave the patient the opportunity to ask questions and patient confirmed all questions answered to satisfaction.     Thyroid nodule: discussed ddx and that only way to know for sure if cancer present is surgery and discussed pros/cons of surgery ; discussed results of FNA and options of genetic testing and potential limitations of genetic testing. Discussed issues with larger nodules and potential for decreased accuracy however her nodules are less than 4 cm. Does not seem to have compressive symptoms either ( would not expect though since gland size normal); discussed about what surgery would entail potentially and about possibility that would not have enough material to complete genetic testing and next steps should that occur    Cough: has seen cardio and pulm; will do trial for a potential silent LPR. Explained about issues related to throat clearing and pathophys and otc recs and exercises related to prevention of throat clearing. Takes xyzal, consider nasal spray trial in future as well; reviewed available outside records from [facility] from cardio and pulm    LPR: 3-4 month trial of PPI; discussed ddx and about silent lpr. " May need GI referral and/or further workup if partial response which can indicate additional diagnosis such as allergic rhinitis and/or condition that makes refractory to medication such as hernia or undiagnosed kimmy    Turbinate hypertrophy: prescribed xyzal but not taking , consider nasal spray trial in future as well; denies subjective symptoms of allergies and denies history of seasonal allergy issue so will try tx for possible silent lpr first but has some mild hypertrophy so will consider adding allergy regimen with nasal sprays +/- po antihistamine     F/u 2 months with genetic testing results and to see how responding to above regimen     I spent a total of 60 minutes on the day of the visit ( this does not include time for procedure which was additional time for a total visit time of 64 minutes). The 60 minutes was for description below:  This includes face to face time and non-face to face time preparing to see the patient (eg, review of tests), obtaining and/or reviewing separately obtained history, documenting clinical information in the electronic or other health record, independently interpreting results and communicating results to the patient/family/caregiver, or care coordinator.   Please be aware that this note has been generated with the assistance of MModal voice-to-text.  Please excuse any spelling or grammatical errors.             [1]   Current Outpatient Medications on File Prior to Visit   Medication Sig Dispense Refill    aspirin (ECOTRIN) 81 MG EC tablet Take 81 mg by mouth once daily.      diclofenac sodium (VOLTAREN ARTHRITIS PAIN) 1 % Gel Apply 2 g topically once daily. 450 g 3    diclofenac sodium (VOLTAREN) 1 % Gel Apply 4 gram to each knee QID when needed for pain 100 g 5    furosemide (LASIX) 20 MG tablet Take 1 tablet by mouth every other day.      hydrALAZINE (APRESOLINE) 100 MG tablet TAKE 1 TABLET TWICE DAILY 180 tablet 3    levocetirizine (XYZAL) 5 MG tablet Take 1 tablet (5 mg  total) by mouth every evening. 30 tablet 11    mirtazapine (REMERON) 7.5 MG Tab TAKE 1 TABLET EVERY EVENING 90 tablet 3    nebivoloL (BYSTOLIC) 10 MG Tab Take by mouth once daily.      PROLIA 60 mg/mL Syrg       suvorexant (BELSOMRA) 10 mg Tab Take 10 mg by mouth every evening. Take within 30 minutes of bedtime without food 30 tablet 0    triamcinolone acetonide 0.1% (KENALOG) 0.1 % ointment Apply topically 2 (two) times daily. 454 g 3    valsartan-hydrochlorothiazide (DIOVAN-HCT) 160-12.5 mg per tablet Take 1 tablet by mouth once daily. 90 tablet 3    vitamin D (VITAMIN D3) 1000 units Tab Take 1 tablet by mouth once daily.       No current facility-administered medications on file prior to visit.

## 2025-03-17 NOTE — PATIENT INSTRUCTIONS
Information and instructions from your visit with me today:    Clearing your throat leads to more swelling in the voice box.  This will worsen your symptoms.  You should try to minimize throat clearing as much as possible.Get a cup of water and a straw and when you feel like you want to clear your throat, blow bubble through the straw into the water     Can try gaviscon liquid over the counter - take 15 minutes after a meal as needed for throat phlegm    Avoid mouthwash with alcohol such as listerine. You should use biotene mouth wash    Can sleep with bedside humidifier     You should aim to drink 2 to 3 liters of water daily if you are drinking one cup of coffee.  If you have trouble drinking water, you can cut back or cut out caffeine. If you have trouble drinking water, you can cut back or cut out caffeine.    Lozenges:  Halls Breezers - sold with cough drops, Can try sugar free lozenges with pectin ,  such as halls fruit breezers      Xylimelts, on toothpaste aisle, these are convenient for when you are talking and or sleeping - they stick to gumline and provide moisture - [employer] or [employer]        Steam and gargle - 3x day  You may consider getting a facial steamer, breathe in warm moist air  through mouth and nose to hydrate vocal folds. On amazon, I like the [employer] version that is under $25.        Gargle:   1/2 tsp each salt, baking soda, clear corn syrup, 6 oz warm water  Sip, gargle quietly, spit, repeat until gone, don't eat/drink for 30 minutes after.      Chew gum with baking soda after meals, Orbit White     Order online, when it's time to get more Gaviscon, either Alginate therapy such as Reflux Gourmet  or Gaviscon Advance can be used following meals and at bedtime for reflux coverage. The Acid Watcher Diet by Dr Brock as well as the Chronic Cough [place] by Dr Villar are good reads to gain improved understanding of dietary and behavioral changes that can aid in reduction of LPRD, and to better  understand cough and cough control     [email or web address]        Diagnosis: Your symptoms are likely caused by laryngo-pharyngealreflux (LPR). This is similar to acid reflux or gastroesophageal reflux disease (GERD) , but instead of just having heartburn, your throat is being irritated due to stomach acid coming up all the way to the back of your voice box.    Symptoms of LPR include sore throat, white phlegm, frequent throat clearing, cough, hoarseness and sensation of something stuck in throat.   Many people with LPR do not have symptoms of heartburn ; this is because the throat tissue is more sensitive to stomach acid  Tips to reduce acid reflux   Decrease caffeine intake if possible and drink at least 2 to 3 liters of water per day ( unless you are instructed by another doctor to not drink a lot of fluid because of a heart condition for example)   Mint, citrus, tomato , red wine,spicy food, red sauce, and chocolate can worsen reflux   Do not eat dinner close to when you go to sleep . Allow at least 3 hours between last meal and sleep  Sleep with head of bed elevated   Medications to start: I will prescribe you a medication that helps decrease acid production in the stomach called a proton pump inhibitor (PPI). Examples of this type of medication include omeprazole (prilosec) , esomeprazole (nexium), and pantoprazole (protonix). Sometimes this medication can be taken once daily to control symptoms. In some patients, the medication needs to be taken twice daily to control symptoms. Always take the first dose of the medication immediately upon awakening in the morning - this is when the acid pumps in your stomach start working. If you are prescribed to take the medication twice daily, take the second dose 30 minutes before your dinner. Your prescription bottle will say how often to take the medication. Please note that it can take 4 to 6 weeks of daily use to notice a change in your symptoms.     It was nice  meeting you today, and I look forward to helping you feel better soon. Please don't hesitate to call if you have any other questions or concerns, or if I can be of any assistance in the meantime.      Delilah Seth MD    [facility]     Phone  [phone]    Fax      [phone]        Delilah Seth MD  Otorhinolaryngology

## 2025-03-19 DIAGNOSIS — I10 ESSENTIAL HYPERTENSION, BENIGN: Chronic | ICD-10-CM

## 2025-03-19 NOTE — TELEPHONE ENCOUNTER
Refill Routing Note   Medication(s) are not appropriate for processing by [facility] for the following reason(s):        Required vitals abnormal    [facility] action(s):  Defer               Appointments  past 12m or future 3m with PCP    Date Provider   Last Visit   1/13/2025 Vasiliy Oneill Jr., MD   Next Visit   5/21/2025 Vasiliy Oneill Jr., MD   ED visits in past 90 days: 0        Note composed:12:08 PM 03/19/2025

## 2025-03-19 NOTE — TELEPHONE ENCOUNTER
No care due was identified.  [facility] Embedded Care Due Messages. Reference number: [id number].   3/19/2025 5:38:49 AM CDT

## 2025-03-20 RX ORDER — HYDRALAZINE HYDROCHLORIDE 100 MG/1
100 TABLET, FILM COATED ORAL 2 TIMES DAILY
Qty: 180 TABLET | Refills: 3 | Status: SHIPPED | OUTPATIENT
Start: 2025-03-20

## 2025-03-20 RX ORDER — VALSARTAN AND HYDROCHLOROTHIAZIDE 160; 12.5 MG/1; MG/1
1 TABLET, FILM COATED ORAL
Qty: 90 TABLET | Refills: 2 | Status: SHIPPED | OUTPATIENT
Start: 2025-03-20

## 2025-03-31 ENCOUNTER — TELEPHONE (OUTPATIENT)
Dept: FAMILY MEDICINE | Facility: CLINIC | Age: OVER 89
End: 2025-03-31
Payer: MEDICARE

## 2025-03-31 NOTE — TELEPHONE ENCOUNTER
Pt was contacted to inform her of upcoming Annual Wellness Visit being corrected. The visit type and the allotted time were both incorrect and has been corrected. If the appointment doesn't work for her, the number was left on the voicemail to contact the office to reschedule for what works better.

## 2025-04-04 ENCOUNTER — OFFICE VISIT (OUTPATIENT)
Dept: ENDOCRINOLOGY | Facility: CLINIC | Age: OVER 89
End: 2025-04-04
Payer: MEDICARE

## 2025-04-04 VITALS
SYSTOLIC BLOOD PRESSURE: 114 MMHG | BODY MASS INDEX: 25.94 KG/M2 | DIASTOLIC BLOOD PRESSURE: 60 MMHG | WEIGHT: 132.81 LBS | HEART RATE: 78 BPM

## 2025-04-04 DIAGNOSIS — E55.9 VITAMIN D DEFICIENCY: ICD-10-CM

## 2025-04-04 DIAGNOSIS — N18.31 STAGE 3A CHRONIC KIDNEY DISEASE: Chronic | ICD-10-CM

## 2025-04-04 DIAGNOSIS — E05.90 SUBCLINICAL HYPERTHYROIDISM: ICD-10-CM

## 2025-04-04 DIAGNOSIS — E04.2 MULTIPLE THYROID NODULES: Primary | Chronic | ICD-10-CM

## 2025-04-04 DIAGNOSIS — M81.0 OSTEOPOROSIS, UNSPECIFIED OSTEOPOROSIS TYPE, UNSPECIFIED PATHOLOGICAL FRACTURE PRESENCE: ICD-10-CM

## 2025-04-04 PROCEDURE — 99999 PR PBB SHADOW E&M-EST. PATIENT-LVL IV: CPT | Mod: PBBFAC,HCNC,, | Performed by: HOSPITALIST

## 2025-04-04 NOTE — ASSESSMENT & PLAN NOTE
- Abnormal thyroid bloodwork, patient in 11/2024 had mildly lower TSH at 0.29, with normal free T4 at 1.14   - Given no prior history of subclinical hyperthyroidism and fluctuation in TSH, along with negative TPO/TRAB antibodies.    - Repeat TSH : 3.1 1/27/2025  - Continue routine monitoring with lab work  - Can consider NM thyroid uptake scan if TSH is suppressed in the future to evaluate for toxic adenoma

## 2025-04-04 NOTE — PROGRESS NOTES
Subjective:      Patient ID: Debbie Earl is a 89 y.o. female presented to [facility] today.  Chief complaint:  Hyperthyroidism      History of Present illness: Debbie Earl is a 89 y.o. female here for multiple thyroid nodules  Other significant past medical history:  Osteoporosis  Current PCP Vasiliy Oneill Jr., MD    Interval history:  Patient is here for discussion of multiple thyroid nodules.  Daughter provided translation.  Found to have multiple thyroid nodules with FNA done 2/19/2025:  FLUS, saw Dr. Seth with ENT 03/2025.  Molecular testing pending, ThyGeNEXT/ThyraMIRv2:  Pending  Patient saw me in the past for osteoporosis, 3/29/2021.  At that time started patient on IV Reclast.  Subsequently lost to follow up  Now on Prolia for osteoporosis managed by PCP, doing well per patient's daughter  One near fall, compliant with calcium and multivitamin      1) Multiple thyroid nodules  - Diagnosed in 12/14/2024 by ultrasound thyroid, showing 2.4 cm right inferior pole, 2.4 cm isthmus nodule  - ultrasound was checked by PCP due to fluctuation in TSH  - Per patient and her daughter denies any compressive symptoms  - Negative TPO/TRAB antibody  - FNA done 2/19/2025  isthmus thyroid nodule: Atypia Undetermined Significance (AUS)   - FNA done 2/19/2025  right lower pole thyroid nodule: Atypia Undetermined Significance (AUS)     - Abnormal thyroid bloodwork, patient in 11/2024 had mildly lower TSH at 0.29, with normal free T4 at 1.14, negative TPO/TRAB antibodies.  Repeat TSH : 3.1 1/27/2025    SYMPTOMS:  Anterior neck swelling/tenderness, or tightness, Compressive symptoms: no   Issues with food impaction: no   Hoarseness, dysphagia, odynophagia: no   Overt hyperthyroid or hypothyroid symptoms: no   Increased need to clear the throat: yes  >> off and on cough, see ENT     US Thyroid  12/14/2024  Right lobe of the thyroid measures 5.0 x 4.2 x 3.2 cm.  Left lobe of the thyroid measures 4.9 x  2.3 x 1.8 cm.  Thyroid demonstrates normal background parenchyma with increased perfusion suggestive of thyroiditis.  Solid hypoechoic nodule in the superior pole measures 1.4 x 1.3 x 1.2 cm consistent with a TR 4 nodule which meets criteria for follow-up ultrasound in 1 year.  1.5 x 1.3 x 1.3 cm isoechoic nodule in the inferior pole consistent with a TR 3 nodule meeting criteria for follow-up ultrasound in 1 year.  Hypoechoic solid-appearing nodule measuring 2.4 x 1.7 x 1.8 cm consistent with a TR 4 nodule meeting criteria for fine-needle aspiration in the inferior pole of the right lobe.     Heterogeneous solid hypoechoic nodule in the isthmus measures 2.4 x 0.9 x 1.7 cm consistent with a TR 4 nodule which meets criteria for fine-needle aspiration.     No thyroid nodules within the left lobe.  A few normal appearing lymph nodes.     Impression:  Two TR 4 nodules in the right lobe of the thyroid and isthmus which meet criteria for fine-needle aspiration.  No suspicious lymph nodes.     Thyroid lab work  Lab Results   Component Value Date    TSH 0.290 (L) 11/21/2024    TSH 3.475 06/10/2023    TSH 5.645 (H) 12/09/2022    FREET4 0.97 01/27/2025    FREET4 1.14 11/21/2024    FREET4 0.85 12/09/2022    Q9SONOI 91 01/27/2025      Antibodies  Lab Results   Component Value Date    THYROPEROXID <6.0 11/21/2024    THYROTROPINR <1.10 01/27/2025         2) Osteoporosis  - Diagnosed on bone density on 2/23/2021, Bone density was done by Rheumatology showing osteopenia with increasing FRAX score pointing towards osteoporosis  - Patient also had elevation in PTH, No history of hypercalcemia dating as far back as 2013  - Patient denies any lower back pain.Fall 1 year ago, fall out of bed, no injury noted  - She had surgical menopause at 31 y/o.    - Pt's Mother had a hip fracture.    - Recent fractures?  No, remote history, 1980s after a fall had a fracture of right elbow  - Height loss (>2 inches)? no  - Low Body Mass Index/Small  stature/size: no  - Family hx of Osteoporosis: not diagnosed  - History of Cancer?  Denies  - Malignancy involving bone (active or history)? no  - Prior radiation treatment? no  - Current diarrhea or h/o malabsorption? no  - Tobacco use ?  No/EtOH use? No   - Dental work planned? No, does have upper dentures  - Noted to have CKD stage IIIA    - Past medication:  IV Reclast 6/16/2021  - Current medication:  Prolia every 6 months, started 7/31/2023> received 4 injections, next injection 6/27/2025  - Managed by PCP now      Recent DXA: Reviewed   7/01/2023 (at [facility])  Lumbar spine (L1-L4): BMD is 1.020 g/cm2, T-score is -0.2, and Z-score is .  Total hip: BMD is 0.870 g/cm2, T-score is -0.6, and Z-score is .  Femoral neck: BMD is 0.629 g/cm2, T-score is -2.0, and Z-score is .  Distal 1/3 radius: Not applicable     FRAX:  13% risk of a major osteoporotic fracture in the next 10 years.  3.5% risk of hip fracture in the next 10 years.     Impression:  *Osteoporosis based on T-score between -1.0 and -2.5 and elevated risk based on FRAX.  *Fracture risk is high.  *Compared with previous DXA, BMD at the lumbar spine has remained stable, and BMD at the total hip has remained stable.     2/24/2021  Lumbar spine (L1-L4): BMD is 0.947 g/cm2, T-score is -0.9, and Z-score is .  Total hip:  BMD is 0.851 g/cm2, T-score is -0.7, and Z-score is .  Femoral neck: BMD is 0.600 g/cm2, T-score is -2.2, and Z-score is .     FRAX:  21% risk of a major osteoporotic fracture in the next 10 years.  14% risk of hip fracture in the next 10 years.     Impression:  LOW BONE MASS.  THE ESTIMATED 10 YEAR PROBABILITY OF HIP FRACTURE IS 14% AND OF A MAJOR OSTEOPOROTIC FRACTURE 21% RESPECTIVELY USING FRAX.  1. Low bone mass    Lab work reviewed  Lab Results   Component Value Date    PTH 31.0 06/10/2023    CALCIUM 9.6 11/16/2024    CALCIUM 9.9 05/16/2024    CALCIUM 9.7 01/11/2024    CAION 1.26 03/03/2021    PLFIBJKB61AD 54 06/10/2023    AOGBMQEM14GZ 54  "12/09/2022     Lab Results   Component Value Date    PHOS 2.2 (L) 01/11/2024    ALKPHOS 60 11/16/2024    ALKPHOS 74 05/16/2024    ALKPHOS 73 01/06/2024    EGFRNORACEVR 48.3 (A) 11/16/2024    ALBUMIN 3.4 (L) 11/16/2024    TSH 0.290 (L) 11/21/2024     No results found for: "PTHRELATEDPR", "[id number]", "CTELOPEPTIDE", "PROCOLLAGEN"     The patient's medications, allergies, past medical, surgical, social and family histories were reviewed and updated as appropriate.  Review of Systems: pertinent positives as per the above HPI, and otherwise negative.    Objective:   /60   Pulse 78   Wt 60.2 kg (132 lb 12.8 oz)   BMI 25.94 kg/m²   Body mass index is 25.94 kg/m².  Vital signs reviewed    Physical Exam  Vitals and nursing note reviewed.   Constitutional:       Appearance: Normal appearance. She is well-developed. She is not ill-appearing.   Neck:      Thyroid: No thyromegaly.   Pulmonary:      Effort: Pulmonary effort is normal. No respiratory distress.   Musculoskeletal:         General: Normal range of motion.      Cervical back: Normal range of motion.   Neurological:      General: No focal deficit present.      Mental Status: She is alert. Mental status is at baseline.   Psychiatric:         Mood and Affect: Mood normal.         Behavior: Behavior normal.       Labs reviewed:  See results in subjective  No results found for: "HGBA1C"  Lab Results   Component Value Date    CHOL 199 11/16/2024    HDL 78 (H) 11/16/2024    LDLCALC 95.6 11/16/2024    TRIG 127 11/16/2024    CHOLHDL 39.2 11/16/2024     Lab Results   Component Value Date     11/16/2024    K 3.7 11/16/2024     11/16/2024    CO2 29 11/16/2024    BUN 22 11/16/2024    CREATININE 1.1 11/16/2024    CALCIUM 9.6 11/16/2024    PHOS 2.2 (L) 01/11/2024    PROT 6.3 11/16/2024    ALBUMIN 3.4 (L) 11/16/2024    BILITOT 0.9 11/16/2024    ALKPHOS 60 11/16/2024    AST 25 11/16/2024    ALT 21 11/16/2024    ANIONGAP 14 11/16/2024    EGFRNORACEVR 48.3 (A) " 11/16/2024    TSH 0.290 (L) 11/21/2024    PTH 31.0 06/10/2023    BBBEDSFO77DV 54 06/10/2023     Assessment     1. Multiple thyroid nodules  US Thyroid      2. Subclinical hyperthyroidism  Ambulatory referral/consult to Endocrinology    TSH    T4, Free    T3    Renal Function Panel      3. Vitamin D deficiency  Vitamin D      4. Osteoporosis, unspecified osteoporosis type, unspecified pathological fracture presence  Renal Function Panel    DXA Bone Density Axial Skeleton 1 or more sites      5. Stage 3a chronic kidney disease           Plan     Multiple thyroid nodules  - Independently reviewed ultrasound images with the patient  today 4/4/2025  - Diagnosed in 12/14/2024 by ultrasound thyroid, showing 2.4 cm right inferior pole, 2.4 cm isthmus nodule  - FNA done 2/19/2025  isthmus thyroid nodule: Atypia Undetermined Significance (AUS)   - FNA done 2/19/2025  right lower pole thyroid nodule: Atypia Undetermined Significance (AUS)   - Denies compressive symptoms  - Saw Dr. Seth with ENT 03/2025. Molecular testing pending, ThyGeNEXT/ThyraMIRv2:  Pending  - Await molecular testing, if positive recommend consideration of thyroid surgery with Dr. Seth  - If molecular testing negative , can repeat ultrasound at the end of the year 12/2025 to monitor size    Subclinical hyperthyroidism  - Abnormal thyroid bloodwork, patient in 11/2024 had mildly lower TSH at 0.29, with normal free T4 at 1.14   - Given no prior history of subclinical hyperthyroidism and fluctuation in TSH, along with negative TPO/TRAB antibodies.    - Repeat TSH : 3.1 1/27/2025  - Continue routine monitoring with lab work  - Can consider NM thyroid uptake scan if TSH is suppressed in the future to evaluate for toxic adenoma    Osteoporosis  - Patient history of osteoporosis on 2/23/2021, Bone density was done by Rheumatology showing osteopenia with increasing FRAX score pointing towards osteoporosis  - Recent fractures?  No, remote history, 1980s  after a fall had a fracture of right elbow.  Did have recent fall without injury  - Dental work planned? No, does have upper dentures  - Noted to have CKD stage IIIA  - Past medication:  IV Reclast 6/16/2021  - Current medication:  Prolia every 6 months, started 7/31/2023> received 4 injections, next injection 6/27/2025  - Managed by PCP now, will continue  - Check DXA 2025, most likely will continue Prolia long-term    Stage 3a chronic kidney disease  - monitor renal function       Advised patient to follow up with PCP for routine health maintenance care.   RTC in in 6 mo for routine monitor of thyroid    Romero Decker M.D.  Endocrinology  [facility]  4/4/2025      Disclaimer: This note has been generated in part with the use of voice-recognition software. There may be typographical errors that have been missed during proof-reading.

## 2025-04-04 NOTE — ASSESSMENT & PLAN NOTE
- Patient history of osteoporosis on 2/23/2021, Bone density was done by Rheumatology showing osteopenia with increasing FRAX score pointing towards osteoporosis  - Recent fractures?  No, remote history, 1980s after a fall had a fracture of right elbow.  Did have recent fall without injury  - Dental work planned? No, does have upper dentures  - Noted to have CKD stage IIIA  - Past medication:  IV Reclast 6/16/2021  - Current medication:  Prolia every 6 months, started 7/31/2023> received 4 injections, next injection 6/27/2025  - Managed by PCP now, will continue  - Check DXA 2025, most likely will continue Prolia long-term

## 2025-04-04 NOTE — ASSESSMENT & PLAN NOTE
- Independently reviewed ultrasound images with the patient  today 4/4/2025  - Diagnosed in 12/14/2024 by ultrasound thyroid, showing 2.4 cm right inferior pole, 2.4 cm isthmus nodule  - FNA done 2/19/2025  isthmus thyroid nodule: Atypia Undetermined Significance (AUS)   - FNA done 2/19/2025  right lower pole thyroid nodule: Atypia Undetermined Significance (AUS)   - Denies compressive symptoms  - Saw Dr. Seth with ENT 03/2025. Molecular testing pending, ThyGeNEXT/ThyraMIRv2:  Pending  - Await molecular testing, if positive recommend consideration of thyroid surgery with Dr. Seth  - If molecular testing negative , can repeat ultrasound at the end of the year 12/2025 to monitor size

## 2025-04-10 ENCOUNTER — DOCUMENTATION ONLY (OUTPATIENT)
Dept: OTOLARYNGOLOGY | Facility: CLINIC | Age: OVER 89
End: 2025-04-10
Payer: MEDICARE

## 2025-04-15 ENCOUNTER — PATIENT MESSAGE (OUTPATIENT)
Dept: ADMINISTRATIVE | Facility: CLINIC | Age: OVER 89
End: 2025-04-15
Payer: MEDICARE

## 2025-04-15 ENCOUNTER — DOCUMENTATION ONLY (OUTPATIENT)
Dept: OTOLARYNGOLOGY | Facility: CLINIC | Age: OVER 89
End: 2025-04-15
Payer: MEDICARE

## 2025-04-21 ENCOUNTER — OFFICE VISIT (OUTPATIENT)
Dept: FAMILY MEDICINE | Facility: CLINIC | Age: OVER 89
End: 2025-04-21
Payer: MEDICARE

## 2025-04-21 VITALS — WEIGHT: 132.69 LBS | HEIGHT: 60 IN | BODY MASS INDEX: 26.05 KG/M2

## 2025-04-21 DIAGNOSIS — R63.4 WEIGHT LOSS, ABNORMAL: ICD-10-CM

## 2025-04-21 DIAGNOSIS — I27.20 PULMONARY HYPERTENSION: ICD-10-CM

## 2025-04-21 DIAGNOSIS — Z00.00 ENCOUNTER FOR MEDICARE ANNUAL WELLNESS EXAM: Primary | ICD-10-CM

## 2025-04-21 DIAGNOSIS — J84.9 ILD (INTERSTITIAL LUNG DISEASE): ICD-10-CM

## 2025-04-21 DIAGNOSIS — N18.31 STAGE 3A CHRONIC KIDNEY DISEASE: ICD-10-CM

## 2025-04-21 DIAGNOSIS — I10 ESSENTIAL HYPERTENSION, BENIGN: ICD-10-CM

## 2025-04-21 DIAGNOSIS — I70.0 AORTIC ATHEROSCLEROSIS: ICD-10-CM

## 2025-04-21 PROBLEM — R06.09 DYSPNEA ON EXERTION: Status: ACTIVE | Noted: 2024-05-08

## 2025-04-21 PROCEDURE — 1159F MED LIST DOCD IN RCRD: CPT | Mod: HCNC,CPTII,95, | Performed by: NURSE PRACTITIONER

## 2025-04-21 PROCEDURE — 1160F RVW MEDS BY RX/DR IN RCRD: CPT | Mod: HCNC,CPTII,95, | Performed by: NURSE PRACTITIONER

## 2025-04-21 PROCEDURE — 1158F ADVNC CARE PLAN TLK DOCD: CPT | Mod: HCNC,CPTII,95, | Performed by: NURSE PRACTITIONER

## 2025-04-21 PROCEDURE — 1170F FXNL STATUS ASSESSED: CPT | Mod: HCNC,CPTII,95, | Performed by: NURSE PRACTITIONER

## 2025-04-21 PROCEDURE — G0439 PPPS, SUBSEQ VISIT: HCPCS | Mod: HCNC,95,, | Performed by: NURSE PRACTITIONER

## 2025-04-21 PROCEDURE — 1101F PT FALLS ASSESS-DOCD LE1/YR: CPT | Mod: HCNC,CPTII,95, | Performed by: NURSE PRACTITIONER

## 2025-04-21 PROCEDURE — 3288F FALL RISK ASSESSMENT DOCD: CPT | Mod: HCNC,CPTII,95, | Performed by: NURSE PRACTITIONER

## 2025-04-21 PROCEDURE — 1126F AMNT PAIN NOTED NONE PRSNT: CPT | Mod: HCNC,CPTII,95, | Performed by: NURSE PRACTITIONER

## 2025-04-21 RX ORDER — MYCOPHENOLATE MOFETIL 500 MG/1
500 TABLET ORAL 2 TIMES DAILY
COMMUNITY
Start: 2025-04-04 | End: 2026-04-04

## 2025-04-21 NOTE — PATIENT INSTRUCTIONS
Counseling and Referral of Other Preventative  (Italic type indicates deductible and co-insurance are waived)    Patient Name: Debibe Earl  Today's Date: 4/21/2025    Health Maintenance       Date Due Completion Date    Shingles Vaccine (1 of 2) 01/11/2014 11/16/2013    TETANUS VACCINE 11/16/2023 11/16/2013 (N/S)    Override on 11/16/2013: (N/S)    Influenza Vaccine (1) 09/01/2024 9/19/2016    RSV Vaccine (Age 60+ and Pregnant patients) (1 - 1-dose 75+ series) 04/21/2025 (Originally 12/14/2010) ---    COVID-19 Vaccine (1) 04/21/2026 (Originally 12/14/1940) ---    Lipid Panel 11/16/2029 11/16/2024        No orders of the defined types were placed in this encounter.    The following information is provided to all patients.  This information is to help you find resources for any of the problems found today that may be affecting your health:                  Living healthy guide: www.[facility].[place].gov      Understanding Diabetes: www.diabetes.org      Eating healthy: www.cdc.gov/healthyweight      CDC home safety checklist: www.cdc.gov/steadi/patient.html      Agency on Aging: www.goea.[place].gov      Alcoholics anonymous (AA): www.aa.org      Physical Activity: www.ynes.nih.gov/nt3xkbo      Tobacco use: www.quitwithusla.org

## 2025-04-21 NOTE — PROGRESS NOTES
The patient location is: [place]  The chief complaint leading to consultation is: Medicare AWV  Visit type: audiovisual  Face to Face time with patient: 38 minutes  45 minutes of total time spent on the encounter, which includes face to face time and non-face to face time preparing to see the patient (eg, review of tests), Obtaining and/or reviewing separately obtained history, Documenting clinical information in the electronic or other health record, Independently interpreting results (not separately reported) and communicating results to the patient/family/caregiver, or Care coordination (not separately reported).     Each patient to whom he or she provides medical services by telemedicine is:  (1) informed of the relationship between the physician and patient and the respective role of any other health care provider with respect to management of the patient; and (2) notified that he or she may decline to receive medical services by telemedicine and may withdraw from such care at any time.        Debbie Earl presented for a  Medicare AWV and comprehensive Health Risk Assessment today. The following components were reviewed and updated:    Medical history  Family History  Social history  Allergies and Current Medications  Health Risk Assessment  Health Maintenance  Care Team         ** See Completed Assessments for Annual Wellness Visit within the encounter summary.**         The following assessments were completed:  Living Situation  CAGE  Depression Screening  Fall Risk Assessment (MACH 10)  Hearing Assessment(HHI)  Cognitive Function Screening  Nutrition Screening  ADL Screening  PAQ Screening    Opioid documentation:      Patient does not have a current opioid prescription.        Vitals:    04/21/25 1028   Weight: 60.2 kg (132 lb 11.5 oz)   Height: 5' (1.524 m)     Body mass index is 25.92 kg/m².    Physical Exam  Constitutional:       General: She is not in acute distress.     Appearance: Normal appearance.  She is well-developed and well-groomed.   HENT:      Head: Normocephalic.   Pulmonary:      Effort: Pulmonary effort is normal. No respiratory distress.   Neurological:      Mental Status: She is alert.   Psychiatric:         Attention and Perception: Attention normal.         Mood and Affect: Mood and affect normal.         Speech: Speech normal.         Behavior: Behavior normal. Behavior is cooperative.     Physical exam limited d/t virtual visit. Patient does not appear to be in any respiratory distress. Able to speak in complete sentences without becoming short of breath.        Diagnoses and health risks identified today and associated recommendations/orders:    1. Encounter for Medicare annual wellness exam  - Referral to Enhanced Annual Wellness Visit (eAWV) W+1    2. Pulmonary hypertension  Chronic; stable on Lasix medication. Follow up with PCP.    3. Aortic atherosclerosis (CT Chest 9-)  Chronic; stable. As seen on previous imaging. Follow up with PCP.    4. ILD (interstitial lung disease)  Chronic; stable on mycophenolate medication. Followed by Pulmonology    5. Stage 3a chronic kidney disease  Chronic; stable on Diovan medication. Follow up with PCP.    6. Essential hypertension, benign  Chronic; stable on Diovan, Lasix, nebivolol and hydralazine medications. Follow up with PCP.    7. Weight loss, abnormal  Has lost about 9 lbs since early March. Feels as though her appetite has reduced.     8. Body mass index (BMI) of 25.0 to 25.9 in adult  Eat a low salt/low fat diet and discussed importance of engaging in physical activity at least 5x/week for a minimum of 30 min/day.      Provided Debbie with a 5-10 year written screening schedule and personal prevention plan. Recommendations were developed using the USPSTF age appropriate recommendations. Education, counseling, and referrals were provided as needed. After Visit Summary given to patient which includes a list of additional screenings/tests  needed.    Follow up for your next annual wellness visit.    Addie Bose NP      Advance Care Planning     I offered to discuss advanced care planning, including how to pick a person who would make decisions for you if you were unable to make them for yourself, called a health care power of , and what kind of decisions you might make such as use of life sustaining treatments such as ventilators and tube feeding when faced with a life limiting illness recorded on a living will that they will need to know. (How you want to be cared for as you near the end of your natural life)     X Patient is interested in learning more about how to make advanced directives.  I offered to discuss this with them and instructed to follow up with her PCP.

## 2025-05-12 ENCOUNTER — PATIENT MESSAGE (OUTPATIENT)
Dept: OTOLARYNGOLOGY | Facility: CLINIC | Age: OVER 89
End: 2025-05-12
Payer: MEDICARE

## 2025-05-13 ENCOUNTER — DOCUMENTATION ONLY (OUTPATIENT)
Dept: OTOLARYNGOLOGY | Facility: CLINIC | Age: OVER 89
End: 2025-05-13
Payer: MEDICARE

## 2025-05-17 ENCOUNTER — LAB VISIT (OUTPATIENT)
Dept: LAB | Facility: HOSPITAL | Age: OVER 89
End: 2025-05-17
Attending: FAMILY MEDICINE
Payer: MEDICARE

## 2025-05-17 DIAGNOSIS — I10 ESSENTIAL HYPERTENSION, BENIGN: ICD-10-CM

## 2025-05-17 LAB
ABSOLUTE EOSINOPHIL (OHS): 0.17 K/UL
ABSOLUTE MONOCYTE (OHS): 0.63 K/UL (ref 0.3–1)
ABSOLUTE NEUTROPHIL COUNT (OHS): 2.79 K/UL (ref 1.8–7.7)
ALBUMIN SERPL BCP-MCNC: 3.4 G/DL (ref 3.5–5.2)
ALP SERPL-CCNC: 58 UNIT/L (ref 40–150)
ALT SERPL W/O P-5'-P-CCNC: 9 UNIT/L (ref 10–44)
ANION GAP (OHS): 10 MMOL/L (ref 8–16)
AST SERPL-CCNC: 20 UNIT/L (ref 11–45)
BASOPHILS # BLD AUTO: 0.02 K/UL
BASOPHILS NFR BLD AUTO: 0.4 %
BILIRUB SERPL-MCNC: 0.7 MG/DL (ref 0.1–1)
BUN SERPL-MCNC: 24 MG/DL (ref 8–23)
CALCIUM SERPL-MCNC: 9.1 MG/DL (ref 8.7–10.5)
CHLORIDE SERPL-SCNC: 102 MMOL/L (ref 95–110)
CO2 SERPL-SCNC: 27 MMOL/L (ref 23–29)
CREAT SERPL-MCNC: 1.2 MG/DL (ref 0.5–1.4)
ERYTHROCYTE [DISTWIDTH] IN BLOOD BY AUTOMATED COUNT: 17.2 % (ref 11.5–14.5)
GFR SERPLBLD CREATININE-BSD FMLA CKD-EPI: 43 ML/MIN/1.73/M2
GLUCOSE SERPL-MCNC: 89 MG/DL (ref 70–110)
HCT VFR BLD AUTO: 37.2 % (ref 37–48.5)
HGB BLD-MCNC: 11.4 GM/DL (ref 12–16)
IMM GRANULOCYTES # BLD AUTO: 0.01 K/UL (ref 0–0.04)
IMM GRANULOCYTES NFR BLD AUTO: 0.2 % (ref 0–0.5)
LYMPHOCYTES # BLD AUTO: 1.79 K/UL (ref 1–4.8)
MCH RBC QN AUTO: 27.3 PG (ref 27–31)
MCHC RBC AUTO-ENTMCNC: 30.6 G/DL (ref 32–36)
MCV RBC AUTO: 89 FL (ref 82–98)
NUCLEATED RBC (/100WBC) (OHS): 0 /100 WBC
PLATELET # BLD AUTO: 281 K/UL (ref 150–450)
PMV BLD AUTO: 10.6 FL (ref 9.2–12.9)
POTASSIUM SERPL-SCNC: 3.5 MMOL/L (ref 3.5–5.1)
PROT SERPL-MCNC: 7.2 GM/DL (ref 6–8.4)
RBC # BLD AUTO: 4.17 M/UL (ref 4–5.4)
RELATIVE EOSINOPHIL (OHS): 3.1 %
RELATIVE LYMPHOCYTE (OHS): 33.1 % (ref 18–48)
RELATIVE MONOCYTE (OHS): 11.6 % (ref 4–15)
RELATIVE NEUTROPHIL (OHS): 51.6 % (ref 38–73)
SODIUM SERPL-SCNC: 139 MMOL/L (ref 136–145)
WBC # BLD AUTO: 5.41 K/UL (ref 3.9–12.7)

## 2025-05-17 PROCEDURE — 36415 COLL VENOUS BLD VENIPUNCTURE: CPT | Mod: HCNC,PO

## 2025-05-17 PROCEDURE — 85025 COMPLETE CBC W/AUTO DIFF WBC: CPT | Mod: HCNC

## 2025-05-17 PROCEDURE — 80053 COMPREHEN METABOLIC PANEL: CPT | Mod: HCNC

## 2025-05-19 ENCOUNTER — OFFICE VISIT (OUTPATIENT)
Dept: OTOLARYNGOLOGY | Facility: CLINIC | Age: OVER 89
End: 2025-05-19
Payer: MEDICARE

## 2025-05-19 VITALS
HEIGHT: 60 IN | WEIGHT: 132.69 LBS | BODY MASS INDEX: 26.05 KG/M2 | SYSTOLIC BLOOD PRESSURE: 145 MMHG | DIASTOLIC BLOOD PRESSURE: 70 MMHG

## 2025-05-19 DIAGNOSIS — E04.1 THYROID NODULE: Primary | ICD-10-CM

## 2025-05-19 DIAGNOSIS — K21.9 LARYNGOPHARYNGEAL REFLUX (LPR): ICD-10-CM

## 2025-05-19 DIAGNOSIS — R09.89 THROAT CLEARING: ICD-10-CM

## 2025-05-19 PROCEDURE — 1101F PT FALLS ASSESS-DOCD LE1/YR: CPT | Mod: CPTII,S$GLB,, | Performed by: OTOLARYNGOLOGY

## 2025-05-19 PROCEDURE — 99214 OFFICE O/P EST MOD 30 MIN: CPT | Mod: S$GLB,,, | Performed by: OTOLARYNGOLOGY

## 2025-05-19 PROCEDURE — 1126F AMNT PAIN NOTED NONE PRSNT: CPT | Mod: CPTII,S$GLB,, | Performed by: OTOLARYNGOLOGY

## 2025-05-19 PROCEDURE — 3288F FALL RISK ASSESSMENT DOCD: CPT | Mod: CPTII,S$GLB,, | Performed by: OTOLARYNGOLOGY

## 2025-05-19 PROCEDURE — 1159F MED LIST DOCD IN RCRD: CPT | Mod: CPTII,S$GLB,, | Performed by: OTOLARYNGOLOGY

## 2025-05-19 RX ORDER — PANTOPRAZOLE SODIUM 20 MG/1
20 TABLET, DELAYED RELEASE ORAL DAILY
Qty: 14 TABLET | Refills: 0 | Status: SHIPPED | OUTPATIENT
Start: 2025-05-19 | End: 2025-06-02

## 2025-05-19 NOTE — PROGRESS NOTES
"  OTOLARYNGOLOGY CLINIC NOTE  Date:  05/19/2025     Chief complaint:  Chief Complaint   Patient presents with    Thyroid Nodule     2 mo f/u- genetic testing results    LPR       History of Present Illness  Debbie Earl is a 89 y.o. female  presenting today for a followup.here with daughter    Still clearing - not as often   Has been doing ppi and cough improved but still clearing   Has not sen gi   Does martinez menthol and does not drink a lot of water    I last saw the patient on 3-17-25  Below text is copied from  note on that date describing history of present illness at that time :    Here with martell. Declined ;  daughter and patient wanted daughter to translate  and not to use monico.      Also has thyroid nodules- had ultrasound and FNA completed;  has apptmt with dr martinez in April      No problems with swallowing; No choking with eating or drinking   No issues with voice. No tobacco use   No issues with acid reflux; no seasonal allergy issues that she is aware of  Has been many years regarding the cough no throat pain   When lays on left side or certain positions coughs more. Not waking up coughing once goes to sleep + snoring ; has not been checked for kimmy. Feels well rested.     Gets phlegm mostly at night. + throat clearing and dry cough during the day.   Gets dry mouth. Does get shortness of breath episodes with coughing and walking.     Cardiologist at [facility] referred her to pulm for the sob . 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 Had bronchoscope note per epic "ILD (interstitial lung disease) (CMS/HCC)/ Poss HP   Evidence of interstitial lung dz - not classic for IPF . Increased lymphocytes on BAL - fungal allergy panel + - clinically feels better - reviewed HRCT - see " "above - she does have a fair degree of air trapping - ? HP   Stopped taking steroids in Nov ( because of side effects) - has been stable - will monitor     RTC in 3 months with CT "  Past Medical History  Past Medical History:   Diagnosis Date    Abnormal EKG     Glaucoma     Hypercholesterolemia     Hypertension     Nuclear sclerosis of both eyes 2022        Past Surgical History  Past Surgical History:   Procedure Laterality Date     SECTION  10/1980    FRACTURE SURGERY  1988    glaucoma laser  Bilateral     PI     HYSTERECTOMY          Medications  Medications Ordered Prior to Encounter[1]    Review of Systems  Review of Systems   Constitutional:  Positive for malaise/fatigue.   HENT: Negative.     Respiratory:  Positive for shortness of breath.    Cardiovascular: Negative.    Gastrointestinal: Negative.    Genitourinary: Negative.    Skin: Negative.    Neurological: Negative.    Psychiatric/Behavioral: Negative.          Answers submitted by the patient for this visit:  Review of Symptoms Questionnaire  (Submitted on 2025)  appetite change : Yes  Unexpected weight loss?: Yes  Cold all of the time? : Yes  Social History   reports that she has never smoked. She has never used smokeless tobacco. She reports that she does not drink alcohol and does not use drugs.     Family History  Family History   Problem Relation Name Age of Onset    Cataracts Mother Jennifer White     Hypertension Mother Jennifer White     No Known Problems Father      Parkinsonism Sister Kayleen     Diabetes Sister Jennifer     No Known Problems Sister Rama     No Known Problems Sister Mariela     No Known Problems Brother x4     Intellectual disability Brother Rishi     Mental illness Brother Rishi     No Known Problems Daughter x1     No Known Problems Maternal Aunt      No Known Problems Maternal Uncle      No Known Problems Paternal Aunt      No Known Problems Paternal Uncle      No Known Problems Maternal " Grandmother      No Known Problems Maternal Grandfather      No Known Problems Paternal Grandmother      No Known Problems Paternal Grandfather      Melanoma Neg Hx          Physical Exam   Vitals:    05/19/25 1048   BP: (!) 145/70    Body mass index is 25.92 kg/m².  Weight: 60.2 kg (132 lb 11.5 oz)   Height: 5' (152.4 cm)     GENERAL: no acute distress.  HEAD: normocephalic.   EYES: No scleral icterus  EARS: external ear without lesion, normal pinna shape and position.  External auditory canal with normal cerumen, tympanic membrane fully visible, no perforation , no retraction. No middle ear effusion. Ossicles intact.   NOSE: external nose without significant bony abnormality  ORAL CAVITY/OROPHARYNX: tongue mobile.   NECK: trachea midline.   LYMPH NODES:No cervical lymphadenopathy.  RESPIRATORY: no stridor, no stertor. Voice normal. Respirations nonlabored.  NEURO: alert, responds to questions appropriately.    PSYCH:mood appropriate        Imaging:  The patient does not have any new imaging of the head and neck since last visit.     Labs:  CBC  Recent Labs   Lab 05/16/24  1125 11/16/24  0810 05/17/25  0827   WBC 6.91 12.49 5.41   Hemoglobin 12.2 13.9  --    HGB  --   --  11.4 L   Hematocrit 40.9 41.8  --    HCT  --   --  37.2   MCV 89 97 89   Platelet Count  --   --  281   Platelets 294 252  --      BMP  Recent Labs   Lab 01/11/24  1041 05/16/24  1125 11/16/24  0810 05/17/25  0827   Glucose 90 76 85 89   Sodium 140 141 143 139   Potassium 4.0 3.9 3.7 3.5   Chloride 102 103 100 102   CO2 29 26 29 27   BUN 16 23 22 24 H   Creatinine 1.1 1.1 1.1 1.2   Calcium 9.7 9.9 9.6 9.1   Phosphorus 2.2 L  --   --   --      COAGS        Assessment  1. Thyroid nodule    2. Laryngopharyngeal reflux (LPR)    3. Throat clearing       Plan:  Discussed plan of care with patient in detail and all questions answered. Patient reported understanding of plan of care. I gave the patient the opportunity to ask questions and patient confirmed  all questions answered to satisfaction.     Ppi wean 20 mg for two weeks then can do prn otc; can do gaviscon otc  as well     Will refer to gi if cough/LPR  comes back after ppi wean  Otc meds for throat clearing given , offered slp, declined at this time    Has f/u with dr martinez in October - can f/u with him for thryoid and if changes concerning on follow up ultrasound can see me if going to marisol surgery but reasonable to monitor with overall findings and age    F/u prn               [1]   Current Outpatient Medications on File Prior to Visit   Medication Sig Dispense Refill    aspirin (ECOTRIN) 81 MG EC tablet Take 81 mg by mouth once daily.      diclofenac sodium (VOLTAREN ARTHRITIS PAIN) 1 % Gel Apply 2 g topically once daily. 450 g 3    hydrALAZINE (APRESOLINE) 100 MG tablet TAKE 1 TABLET TWICE DAILY 180 tablet 3    mirtazapine (REMERON) 7.5 MG Tab TAKE 1 TABLET EVERY EVENING 90 tablet 3    mycophenolate (CELLCEPT) 500 mg Tab Take 500 mg by mouth 2 (two) times daily.      nebivoloL (BYSTOLIC) 10 MG Tab Take by mouth once daily.      pantoprazole (PROTONIX) 40 MG tablet Take 1 tablet (40 mg total) by mouth once daily. Take first thing in the morning 90 tablet 3    PROLIA 60 mg/mL Syrg       valsartan-hydrochlorothiazide (DIOVAN-HCT) 160-12.5 mg per tablet TAKE 1 TABLET EVERY DAY 90 tablet 2    vitamin D (VITAMIN D3) 1000 units Tab Take 1 tablet by mouth once daily.      furosemide (LASIX) 20 MG tablet Take 1 tablet by mouth every other day.       No current facility-administered medications on file prior to visit.

## 2025-05-19 NOTE — PATIENT INSTRUCTIONS
Clearing your throat leads to more swelling in the voice box.  This will worsen your symptoms.  You should try to minimize throat clearing as much as possible.Get a cup of water and a straw and when you feel like you want to clear your throat, blow bubble through the straw into the water     Can try gaviscon liquid over the counter - take 15 minutes after a meal as needed for throat phlegm    Avoid mouthwash with alcohol such as listerine. You should use biotene mouth wash    Can sleep with bedside humidifier     You should aim to drink 2 to 3 liters of water daily if you are drinking one cup of coffee.  If you have trouble drinking water, you can cut back or cut out caffeine. If you have trouble drinking water, you can cut back or cut out caffeine.    Lozenges:  Halls Breezers - sold with cough drops, Can try sugar free lozenges with pectin ,  such as halls fruit breezers      Xylimelts, on toothpaste aisle, these are convenient for when you are talking and or sleeping - they stick to gumline and provide moisture - Ambature or [employer]        Steam and gargle - 3x day  You may consider getting a facial steamer, breathe in warm moist air  through mouth and nose to hydrate vocal folds. On amazon, I like the [employer]air version that is under $25.        Gargle:   1/2 tsp each salt, baking soda, clear corn syrup, 6 oz warm water  Sip, gargle quietly, spit, repeat until gone, don't eat/drink for 30 minutes after.      Chew gum with baking soda after meals, Orbit White     Order online, when it's time to get more Gaviscon, either Alginate therapy such as Reflux Gourmet  or Gaviscon Advance can be used following meals and at bedtime for reflux coverage. The Acid Watcher Diet by Dr Brock as well as the Chronic Cough [place] by Dr Villar are good reads to gain improved understanding of dietary and behavioral changes that can aid in reduction of LPRD, and to better understand cough and cough control     www.[email or web address].com

## 2025-05-21 ENCOUNTER — OFFICE VISIT (OUTPATIENT)
Dept: FAMILY MEDICINE | Facility: CLINIC | Age: OVER 89
End: 2025-05-21
Payer: MEDICARE

## 2025-05-21 ENCOUNTER — PATIENT MESSAGE (OUTPATIENT)
Dept: FAMILY MEDICINE | Facility: CLINIC | Age: OVER 89
End: 2025-05-21
Payer: MEDICARE

## 2025-05-21 ENCOUNTER — PATIENT MESSAGE (OUTPATIENT)
Dept: FAMILY MEDICINE | Facility: CLINIC | Age: OVER 89
End: 2025-05-21

## 2025-05-21 ENCOUNTER — TELEPHONE (OUTPATIENT)
Dept: FAMILY MEDICINE | Facility: CLINIC | Age: OVER 89
End: 2025-05-21
Payer: MEDICARE

## 2025-05-21 ENCOUNTER — LAB VISIT (OUTPATIENT)
Dept: LAB | Facility: HOSPITAL | Age: OVER 89
End: 2025-05-21
Attending: FAMILY MEDICINE
Payer: MEDICARE

## 2025-05-21 VITALS
DIASTOLIC BLOOD PRESSURE: 54 MMHG | OXYGEN SATURATION: 90 % | HEIGHT: 60 IN | BODY MASS INDEX: 25.79 KG/M2 | RESPIRATION RATE: 19 BRPM | WEIGHT: 131.38 LBS | TEMPERATURE: 98 F | SYSTOLIC BLOOD PRESSURE: 124 MMHG | HEART RATE: 77 BPM

## 2025-05-21 DIAGNOSIS — J84.9 ILD (INTERSTITIAL LUNG DISEASE): Chronic | ICD-10-CM

## 2025-05-21 DIAGNOSIS — N18.31 STAGE 3A CHRONIC KIDNEY DISEASE: ICD-10-CM

## 2025-05-21 DIAGNOSIS — I10 ESSENTIAL HYPERTENSION, BENIGN: Chronic | ICD-10-CM

## 2025-05-21 DIAGNOSIS — I27.20 PULMONARY HYPERTENSION: Chronic | ICD-10-CM

## 2025-05-21 DIAGNOSIS — N18.31 STAGE 3A CHRONIC KIDNEY DISEASE: Primary | Chronic | ICD-10-CM

## 2025-05-21 LAB
CREAT UR-MCNC: 55.4 MG/DL (ref 15–325)
PROT UR-MCNC: 8 MG/DL
PROT/CREAT UR: 0.14 MG/G{CREAT}
PTH-INTACT SERPL-MCNC: 95.4 PG/ML (ref 9–77)
URATE SERPL-MCNC: 7.2 MG/DL (ref 2.4–5.7)

## 2025-05-21 PROCEDURE — 86235 NUCLEAR ANTIGEN ANTIBODY: CPT | Mod: 59,HCNC

## 2025-05-21 PROCEDURE — 99999 PR PBB SHADOW E&M-EST. PATIENT-LVL V: CPT | Mod: PBBFAC,HCNC,, | Performed by: FAMILY MEDICINE

## 2025-05-21 PROCEDURE — 86160 COMPLEMENT ANTIGEN: CPT | Mod: 59,HCNC

## 2025-05-21 PROCEDURE — 86225 DNA ANTIBODY NATIVE: CPT | Mod: HCNC

## 2025-05-21 PROCEDURE — 86039 ANTINUCLEAR ANTIBODIES (ANA): CPT | Mod: HCNC

## 2025-05-21 PROCEDURE — 3288F FALL RISK ASSESSMENT DOCD: CPT | Mod: CPTII,HCNC,S$GLB, | Performed by: FAMILY MEDICINE

## 2025-05-21 PROCEDURE — 1126F AMNT PAIN NOTED NONE PRSNT: CPT | Mod: CPTII,HCNC,S$GLB, | Performed by: FAMILY MEDICINE

## 2025-05-21 PROCEDURE — 1159F MED LIST DOCD IN RCRD: CPT | Mod: CPTII,HCNC,S$GLB, | Performed by: FAMILY MEDICINE

## 2025-05-21 PROCEDURE — 36415 COLL VENOUS BLD VENIPUNCTURE: CPT | Mod: HCNC,PN

## 2025-05-21 PROCEDURE — 86431 RHEUMATOID FACTOR QUANT: CPT | Mod: HCNC

## 2025-05-21 PROCEDURE — 83970 ASSAY OF PARATHORMONE: CPT | Mod: HCNC

## 2025-05-21 PROCEDURE — 1160F RVW MEDS BY RX/DR IN RCRD: CPT | Mod: CPTII,HCNC,S$GLB, | Performed by: FAMILY MEDICINE

## 2025-05-21 PROCEDURE — 99214 OFFICE O/P EST MOD 30 MIN: CPT | Mod: HCNC,S$GLB,, | Performed by: FAMILY MEDICINE

## 2025-05-21 PROCEDURE — 86235 NUCLEAR ANTIGEN ANTIBODY: CPT | Mod: HCNC

## 2025-05-21 PROCEDURE — 86036 ANCA SCREEN EACH ANTIBODY: CPT | Mod: HCNC

## 2025-05-21 PROCEDURE — 86160 COMPLEMENT ANTIGEN: CPT | Mod: HCNC

## 2025-05-21 PROCEDURE — 84550 ASSAY OF BLOOD/URIC ACID: CPT | Mod: HCNC

## 2025-05-21 PROCEDURE — G2211 COMPLEX E/M VISIT ADD ON: HCPCS | Mod: HCNC,S$GLB,, | Performed by: FAMILY MEDICINE

## 2025-05-21 PROCEDURE — 1101F PT FALLS ASSESS-DOCD LE1/YR: CPT | Mod: CPTII,HCNC,S$GLB, | Performed by: FAMILY MEDICINE

## 2025-05-21 PROCEDURE — 86334 IMMUNOFIX E-PHORESIS SERUM: CPT | Mod: HCNC

## 2025-05-21 PROCEDURE — 82570 ASSAY OF URINE CREATININE: CPT | Mod: HCNC | Performed by: FAMILY MEDICINE

## 2025-05-21 PROCEDURE — 83521 IG LIGHT CHAINS FREE EACH: CPT | Mod: HCNC

## 2025-05-21 PROCEDURE — 84165 PROTEIN E-PHORESIS SERUM: CPT | Mod: HCNC

## 2025-05-21 NOTE — TELEPHONE ENCOUNTER
Spoke with daughter to confirm that she requested for her mother's labs to be changed as well to Saturday 11/15/25? Daughter does request. Labs changed to Saturday 11/15/25 @ [facility].

## 2025-05-22 LAB
C3 SERPL-MCNC: 131 MG/DL (ref 50–180)
C4 COMPLEMENT (OHS): 29 MG/DL (ref 11–44)
RHEUMATOID FACT SERPL-ACNC: <13 IU/ML

## 2025-05-23 ENCOUNTER — TELEPHONE (OUTPATIENT)
Dept: NEPHROLOGY | Facility: CLINIC | Age: OVER 89
End: 2025-05-23
Payer: MEDICARE

## 2025-05-23 LAB
ALBUMIN, SPE (OHS): 3.74 G/DL (ref 3.35–5.55)
ALPHA 1 GLOB (OHS): 0.34 GM/DL (ref 0.17–0.41)
ALPHA 2 GLOB (OHS): 0.83 GM/DL (ref 0.43–0.99)
BETA GLOB (OHS): 0.94 GM/DL (ref 0.5–1.1)
GAMMA GLOBULIN (OHS): 1.45 GM/DL (ref 0.67–1.58)
KAPPA LC FREE SER-MCNC: 1.79 MG/L (ref 0.26–1.65)
KAPPA LC FREE/LAMBDA FREE SER: 4.74 MG/DL (ref 0.33–1.94)
LAMBDA LC FREE SERPL-MCNC: 2.65 MG/DL (ref 0.57–2.63)
PATHOLOGIST INTERPRETATION - IFE SERUM (OHS): NORMAL
PATHOLOGIST REVIEW - SPE (OHS): NORMAL
PROT SERPL-MCNC: 7.3 GM/DL (ref 6–8.4)

## 2025-05-24 LAB
ANA (OHS): POSITIVE
ANA PATTERN 1 (OHS): ABNORMAL
ANA TITER 1 (OHS): ABNORMAL

## 2025-05-26 LAB
SM  ANTIBODY (OHS): 0.14 RATIO
SM INTERPRETATION (OHS): NEGATIVE
SM/RNP ANTIBODY (OHS): 0.12 RATIO
SM/RNP INTERPRETATION (OHS): NEGATIVE
SSA  ANTIBODY (OHS): 0.07 RATIO (ref 0–0.99)
SSA INTERPRETATION (OHS): NEGATIVE
SSB  ANTIBODY (OHS): 0.07 RATIO
SSB INTERPRETATION (OHS): NEGATIVE

## 2025-05-26 NOTE — PROGRESS NOTES
Patient Name: Debbie Earl    : 1935  MRN: [id number]      Subjective:     Patient ID: Debbie is a 89 y.o. female    Chief Complaint:  Follow-up    History of Present Illness    Debbie presents today for routine monitoring of blood pressure and medications.  She is present with her daughter.    She reports worsening interstitial lung disease on recent imaging. She experiences shortness of breath with ambulation that resolves with rest and sitting. Symptoms began in adulthood within the past 1-2 years. She denies history of occupational exposures or respiratory infections.    Current medications include aspirin, diclofenac gel PRN, furosemide, hydralazine 100mg BID, mirtazapine 7.5mg for sleep and anxiety (desires to maintain current dose), mycophenolate (Cellcept) one pill BID, nebivolol 10mg daily, pantoprazole 40mg daily, valsartan/hydrochlorothiazide 160/12.5mg daily, vitamin D 1000 units daily, and Prolia injections every 6 months.    Review of Systems   Constitutional:  Negative for unexpected weight change.   HENT:  Negative for ear pain and sore throat.    Eyes:  Negative for visual disturbance.   Respiratory:  Positive for cough. Negative for shortness of breath.    Cardiovascular:  Negative for chest pain and palpitations.   Gastrointestinal:  Negative for abdominal pain and blood in stool.   Genitourinary:  Negative for dysuria and frequency.   Musculoskeletal:  Negative for neck pain.   Integumentary:  Negative for rash.   Neurological:  Negative for weakness, numbness and headaches.   Hematological:  Negative for adenopathy.   Psychiatric/Behavioral:  Negative for suicidal ideas.         Objective:   BP (!) 124/54 (BP Location: Left arm, Patient Position: Sitting)   Pulse 77   Temp 98 °F (36.7 °C) (Oral)   Resp 19   Ht 5' (1.524 m)   Wt 59.6 kg (131 lb 6.3 oz)   SpO2 (!) 90%   BMI 25.66 kg/m²     Physical Exam  Vitals reviewed.   Constitutional:       General: She is not in acute  distress.  HENT:      Head: Normocephalic and atraumatic.      Right Ear: Ear canal and external ear normal.      Left Ear: Ear canal and external ear normal.      Nose: Nose normal.      Mouth/Throat:      Mouth: Mucous membranes are moist.      Pharynx: No oropharyngeal exudate or posterior oropharyngeal erythema.   Eyes:      Extraocular Movements: Extraocular movements intact.      Conjunctiva/sclera: Conjunctivae normal.      Pupils: Pupils are equal, round, and reactive to light.   Cardiovascular:      Rate and Rhythm: Normal rate and regular rhythm.   Pulmonary:      Effort: Pulmonary effort is normal. No respiratory distress.      Breath sounds: No wheezing, rhonchi or rales.   Abdominal:      General: Abdomen is flat. Bowel sounds are normal. There is no distension.      Palpations: Abdomen is soft.      Tenderness: There is no abdominal tenderness. There is no guarding.   Musculoskeletal:      Cervical back: Normal range of motion. No rigidity or tenderness.   Lymphadenopathy:      Cervical: No cervical adenopathy.   Skin:     General: Skin is warm.      Capillary Refill: Capillary refill takes less than 2 seconds.   Neurological:      Mental Status: She is alert and oriented to person, place, and time.      Cranial Nerves: No cranial nerve deficit.      Sensory: No sensory deficit.   Psychiatric:         Mood and Affect: Mood normal.         Behavior: Behavior normal.            Assessment        ICD-10-CM ICD-9-CM   1. Stage 3a chronic kidney disease  N18.31 585.3   2. ILD (interstitial lung disease)  J84.9 515   3. Essential hypertension, benign  I10 401.1   4. Pulmonary hypertension  I27.20 416.8         Plan:   Assessment & Plan        STAGE 3A CHRONIC KIDNEY DISEASE:  Ordered comprehensive lab panel and kidney-specific labs prior to the nephrology consult to maximize efficiency of initial visit.  Scheduled kidney ultrasound for June 2nd.  Will continue to monitor blood pressure and medications  routinely.  Recent lab results from 4 days ago showed excellent white blood cell count and all values within normal limits.    INTERSTITIAL LUNG DISEASE:  Monitored patient's interstitial lung disease routinely; no supplemental oxygen required.  Continued prescribed inhaler therapy for home use.  Patient's saw pulmonology last month.    ESSENTIAL HYPERTENSION:  Continued antihypertensive regimen: hydralazine 100 mg twice daily, nebivolol 10 mg daily, and valsartan/hydrochlorothiazide 160/12.5 mg daily.     PULMONARY HYPERTENSION:    Continue recommendations from pulmonology and Cardiology.          1. Stage 3a chronic kidney disease  Overview:  Lab Results   Component Value Date    EGFRNORACEVR 43 (L) 05/17/2025    EGFRNORACEVR 48.3 (A) 11/16/2024    EGFRNORACEVR 48 (A) 05/16/2024      Lab Results   Component Value Date    CREATININE 1.2 05/17/2025    CREATININE 1.1 11/16/2024    CREATININE 1.1 05/16/2024      Lab Results   Component Value Date    MICALBCREAT Unable to calculate 06/09/2022    MICALBCREAT 9.8 12/03/2021      Lab Results   Component Value Date    UTPCR 0.14 05/16/2024    UTPCR Unable to calculate 06/19/2023    UTPCR Unable to calculate 11/12/2020       Orders:  -     Ambulatory referral/consult to Nephrology; Future; Expected date: 05/28/2025  -     PTH, Intact; Future; Expected date: 05/21/2025  -     C3 Complement; Future; Expected date: 05/21/2025  -     C4 Complement; Future; Expected date: 05/21/2025  -     Immunoglobulin Free LT Chains Blood; Future; Expected date: 05/21/2025  -     Immunofixation, Serum; Future; Expected date: 05/21/2025  -     Protein Electrophoresis, Serum; Future; Expected date: 05/21/2025  -     MIGUE Screen w/Reflex; Future; Expected date: 05/21/2025  -     Anti-Neutrophilic Cytoplasmic Antibody; Future; Expected date: 05/21/2025  -     Rheumatoid Factor; Future; Expected date: 05/21/2025  -     Protein/Creatinine Ratio, Urine  -     Uric Acid; Future; Expected date:  05/21/2025  -     US Retroperitoneal Complete; Future; Expected date: 05/21/2025  -     Comprehensive Metabolic Panel; Future; Expected date: 11/21/2025  -     CBC Auto Differential; Future; Expected date: 11/21/2025    2. ILD (interstitial lung disease)    3. Essential hypertension, benign  -     Comprehensive Metabolic Panel; Future; Expected date: 11/21/2025  -     CBC Auto Differential; Future; Expected date: 11/21/2025    4. Pulmonary hypertension  Overview:  03-  Echocardiogram  RVSP ~ 55.4 mmHg.     Cardiologist-  Dr. Paul at [facility]               -Vasiliy Oneill Jr., MD, AAHIVS      This note was generated with the assistance of ambient listening technology. Verbal consent was obtained by the patient and accompanying visitor(s) for the recording of patient appointment to facilitate this note. I attest to having reviewed and edited the generated note for accuracy, though some syntax or spelling errors may persist. Please contact the author of this note for any clarification.      There are no Patient Instructions on file for this visit.      Follow up in about 6 months (around 11/21/2025) for Chronic Problems (labs a week prior).   Future Appointments   Date Time Provider Department Center   6/2/2025  3:00 PM [facility] ROOM 3 [facility] ULSOUND [facility]   6/27/2025  9:00 AM INJECTION, WB INFUSION [facility] CHEMO [facility]   8/13/2025 10:00 AM Ryan Candelaria MD [facility] NEURO [facility]   9/22/2025  9:00 AM Levy Mueller MD [facility] NEPHRO [facility]   9/26/2025 12:45 PM [facility] ROOM 4 [facility] ULSOUND [facility]   9/29/2025  8:30 AM LAB, [facility] [place]   9/29/2025  9:30 AM [facility] DEXA1 [facility] [place]   10/6/2025  9:30 AM Romero Decker MD [facility] ENDOCRN [facility]   11/15/2025  8:30 AM LAB, [facility] [place]   11/21/2025 10:40 AM Vasiliy Oneill Jr., MD [facility]   4/24/2026 10:30 AM Addie Bose NP [facility]

## 2025-05-27 LAB
DSDNA ANTIBODY (OHS): NORMAL
DSDNA ANTIBODY TITER (OHS): NORMAL

## 2025-05-28 LAB
W C-ANCA: ABNORMAL TITER
W P-ANCA: ABNORMAL TITER

## 2025-06-01 ENCOUNTER — RESULTS FOLLOW-UP (OUTPATIENT)
Dept: FAMILY MEDICINE | Facility: CLINIC | Age: OVER 89
End: 2025-06-01

## 2025-06-02 ENCOUNTER — TELEPHONE (OUTPATIENT)
Dept: FAMILY MEDICINE | Facility: CLINIC | Age: OVER 89
End: 2025-06-02
Payer: MEDICARE

## 2025-06-02 ENCOUNTER — HOSPITAL ENCOUNTER (OUTPATIENT)
Dept: RADIOLOGY | Facility: HOSPITAL | Age: OVER 89
Discharge: HOME | End: 2025-06-02
Attending: FAMILY MEDICINE
Payer: MEDICARE

## 2025-06-02 DIAGNOSIS — N18.31 STAGE 3A CHRONIC KIDNEY DISEASE: Chronic | ICD-10-CM

## 2025-06-02 PROCEDURE — 76770 US EXAM ABDO BACK WALL COMP: CPT | Mod: TC

## 2025-06-02 PROCEDURE — 76770 US EXAM ABDO BACK WALL COMP: CPT | Mod: 26,,, | Performed by: STUDENT IN AN ORGANIZED HEALTH CARE EDUCATION/TRAINING PROGRAM

## 2025-06-12 ENCOUNTER — OFFICE VISIT (OUTPATIENT)
Dept: FAMILY MEDICINE | Facility: CLINIC | Age: OVER 89
End: 2025-06-12
Payer: MEDICARE

## 2025-06-12 VITALS
WEIGHT: 120.38 LBS | TEMPERATURE: 98 F | SYSTOLIC BLOOD PRESSURE: 126 MMHG | RESPIRATION RATE: 18 BRPM | DIASTOLIC BLOOD PRESSURE: 60 MMHG | BODY MASS INDEX: 23.63 KG/M2 | HEIGHT: 60 IN | HEART RATE: 84 BPM | OXYGEN SATURATION: 96 %

## 2025-06-12 DIAGNOSIS — R76.8 ABNORMAL ANCA TEST: Primary | ICD-10-CM

## 2025-06-12 DIAGNOSIS — F51.04 PSYCHOPHYSIOLOGICAL INSOMNIA: Chronic | ICD-10-CM

## 2025-06-12 DIAGNOSIS — F41.9 ANXIETY: Chronic | ICD-10-CM

## 2025-06-12 PROCEDURE — G2211 COMPLEX E/M VISIT ADD ON: HCPCS | Mod: S$GLB,,, | Performed by: FAMILY MEDICINE

## 2025-06-12 PROCEDURE — 99999 PR PBB SHADOW E&M-EST. PATIENT-LVL IV: CPT | Mod: PBBFAC,,, | Performed by: FAMILY MEDICINE

## 2025-06-12 PROCEDURE — 1160F RVW MEDS BY RX/DR IN RCRD: CPT | Mod: CPTII,S$GLB,, | Performed by: FAMILY MEDICINE

## 2025-06-12 PROCEDURE — 1159F MED LIST DOCD IN RCRD: CPT | Mod: CPTII,S$GLB,, | Performed by: FAMILY MEDICINE

## 2025-06-12 PROCEDURE — 99214 OFFICE O/P EST MOD 30 MIN: CPT | Mod: S$GLB,,, | Performed by: FAMILY MEDICINE

## 2025-06-12 PROCEDURE — 1126F AMNT PAIN NOTED NONE PRSNT: CPT | Mod: CPTII,S$GLB,, | Performed by: FAMILY MEDICINE

## 2025-06-12 RX ORDER — MIRTAZAPINE 15 MG/1
15 TABLET, FILM COATED ORAL NIGHTLY
Qty: 90 TABLET | Refills: 0 | Status: SHIPPED | OUTPATIENT
Start: 2025-06-12

## 2025-06-16 NOTE — PROGRESS NOTES
Patient Name: Debbie Earl    : 1935  MRN: [id number]      Subjective:     Patient ID: Debbie is a 89 y.o. female    Chief Complaint:  Results    History of Present Illness    Debbie presents today for follow up of abnormal blood test results    Anti-MPO antibody level was 68 (normal <3.5). MIGUE pattern is currently homogeneous, with previous studies showing a speckled pattern. Uric acid is mildly elevated.    She reports joint swelling in feet with prolonged standing but denies joint pain. She experiences skin hardening and thickening, particularly a worsening facial rash. She reports difficulty cutting nails. She experiences persistent tingling sensation in legs and reports always feeling cold.    She reports poor sleep quality despite taking Mirtazapine. She is unable to achieve restful sleep despite sleeping.        ROS:  General: -fever, -chills, -fatigue, -weight gain, +weight loss, +sleep disturbances, +cold intolerance  Eyes: -vision changes, -redness, -discharge  ENT: -ear pain, -nasal congestion, -sore throat  Cardiovascular: -chest pain, -palpitations, +lower extremity edema  Respiratory: -cough, -shortness of breath, +difficulty breathing  Gastrointestinal: -abdominal pain, -nausea, -vomiting, -diarrhea, -constipation, -blood in stool  Genitourinary: -dysuria, -hematuria, -frequency  Musculoskeletal: -joint pain, -muscle pain  Skin: +rash, -lesion, +skin tightness, +skin texture changes  Neurological: -headache, -dizziness, -numbness, +tingling  Psychiatric: +anxiety, -depression, -sleep difficulty           Objective:   /60 (BP Location: Left arm, Patient Position: Sitting)   Pulse 84   Temp 97.9 °F (36.6 °C) (Oral)   Resp 18   Ht 5' (1.524 m)   Wt 54.6 kg (120 lb 5.9 oz)   SpO2 96%   BMI 23.51 kg/m²     Physical Exam  Vitals reviewed.   Constitutional:       General: She is not in acute distress.  HENT:      Head: Normocephalic and atraumatic.      Right Ear: Ear canal and external ear  normal.      Left Ear: Ear canal and external ear normal.      Nose: Nose normal.      Mouth/Throat:      Mouth: Mucous membranes are moist.      Pharynx: No oropharyngeal exudate or posterior oropharyngeal erythema.   Eyes:      Extraocular Movements: Extraocular movements intact.      Conjunctiva/sclera: Conjunctivae normal.      Pupils: Pupils are equal, round, and reactive to light.   Cardiovascular:      Rate and Rhythm: Normal rate and regular rhythm.   Pulmonary:      Effort: Pulmonary effort is normal. No respiratory distress.      Breath sounds: No wheezing, rhonchi or rales.   Abdominal:      General: Abdomen is flat. Bowel sounds are normal. There is no distension.      Palpations: Abdomen is soft.      Tenderness: There is no abdominal tenderness. There is no guarding.   Musculoskeletal:      Cervical back: Normal range of motion. No rigidity or tenderness.   Lymphadenopathy:      Cervical: No cervical adenopathy.   Skin:     General: Skin is warm.      Capillary Refill: Capillary refill takes less than 2 seconds.   Neurological:      Mental Status: She is alert and oriented to person, place, and time.      Cranial Nerves: No cranial nerve deficit.      Sensory: No sensory deficit.   Psychiatric:         Mood and Affect: Mood normal.         Behavior: Behavior normal.        Lab Visit on 05/21/2025   Component Date Value Ref Range Status    PTH Intact 05/21/2025 95.4 (H)  9.0 - 77.0 pg/mL Final    C3 Complement 05/21/2025 131  50 - 180 mg/dL Final    C4 Complement 05/21/2025 29  11 - 44 mg/dL Final    Kappa Free Light Chain 05/21/2025 4.74 (H)  0.33 - 1.94 mg/dL Final    Kappa/Lambda FLC Ratio 05/21/2025 1.79 (H)  0.26 - 1.65 Final    Lambda Free Light Chain 05/21/2025 2.65 (H)  0.57 - 2.63 mg/dL Final    Pathologist Interpretation GERALD 05/21/2025 No monoclonal peaks identified.          Interpreting Pathologist: Marce Bains MD       Final    Protein Total 05/21/2025 7.3  6.0 - 8.4 gm/dL Final     Serum protein electrophoresis and immunofixation results should be interpreted in clinical context in that some therapeutic agents can result in false positive results (example, daratumumab). Correlation with the patient's therapeutic regimen is required.    Alpha 1 Glob 05/21/2025 0.34  0.17 - 0.41 gm/dl Final    Alpha 2 Glob 05/21/2025 0.83  0.43 - 0.99 gm/dl Final    Beta Glob 05/21/2025 0.94  0.50 - 1.10 gm/dl Final    Gamma Globulin 05/21/2025 1.45  0.67 - 1.58 gm/dl Final    Albumin, SPE 05/21/2025 3.74  3.35 - 5.55 g/dL Final    MIGUE 05/21/2025 Positive (A)  Negative <1:80 Final    MIGUE Titer 1 05/21/2025 1:640   Final    MIGUE Pattern 1  05/21/2025 Homogeneous   Final    P-ANCA 05/21/2025 See Below (A)  <1:20 Titer Final    C-ANCA 05/21/2025 See Below (A)  <1:20 Titer Final    This patient has a positive MIGUE that interferes with the   titer of the ANCA.  Because of this, please use the   anti-MPO EIA to follow this patient.  The anti-PR3 EIA  was negative.     THE ANTI-MPO = 68  (normal is <3.5 U/ml).     Test performed at [facility],  [facility] [place]  [address] [phone]  Humaira Francis MD, PhD - Medical Director    Rheumatoid Factor 05/21/2025 <13.0  <=15.0 IU/mL Final    Uric Acid 05/21/2025 7.2 (H)  2.4 - 5.7 mg/dL Final    Pathologist Interpretation SPE 05/21/2025 Normal total protein, normal pattern.           Interpreting Pathologist: Marce Bains MD       Final    dsDNA Ab Qual 05/21/2025 Negative 1:10  Negative 1:10 Final    Performed by fluorescent crithidia assay.    SSA Interpretation 05/21/2025 Negative  Negative Final    SSA Antibody 05/21/2025 0.07  0.00 - 0.99 Ratio Final    SSB Interpretation 05/21/2025 Negative   Final    SSB Antibody 05/21/2025 0.07  Ratio Final    Anti Sm Antibody 05/21/2025 0.14  Ratio Final    SM Interpretation 05/21/2025 Negative   Final    Anti-Sm/RNP Interpretation 05/21/2025 Negative  Negative Final    SM/RNP Antibody  05/21/2025 0.12  Ratio Final   Office Visit on 05/21/2025   Component Date Value Ref Range Status    Urine Creatinine 05/21/2025 55.4  15.0 - 325.0 mg/dL Final    Urine Protein 05/21/2025 8  <=15 mg/dL Final    Urine Protein/Creatinine Ratio 05/21/2025 0.14  <=0.20 Final   Lab Visit on 05/17/2025   Component Date Value Ref Range Status    Sodium 05/17/2025 139  136 - 145 mmol/L Final    Potassium 05/17/2025 3.5  3.5 - 5.1 mmol/L Final    Chloride 05/17/2025 102  95 - 110 mmol/L Final    CO2 05/17/2025 27  23 - 29 mmol/L Final    Glucose 05/17/2025 89  70 - 110 mg/dL Final    BUN 05/17/2025 24 (H)  8 - 23 mg/dL Final    Creatinine 05/17/2025 1.2  0.5 - 1.4 mg/dL Final    Calcium 05/17/2025 9.1  8.7 - 10.5 mg/dL Final    Protein Total 05/17/2025 7.2  6.0 - 8.4 gm/dL Final    Albumin 05/17/2025 3.4 (L)  3.5 - 5.2 g/dL Final    Bilirubin Total 05/17/2025 0.7  0.1 - 1.0 mg/dL Final    For infants and newborns, interpretation of results should be based   on gestational age, weight and in agreement with clinical   observations.    Premature Infant recommended reference ranges:   0-24 hours:  <8.0 mg/dL   24-48 hours: <12.0 mg/dL   3-5 days:    <15.0 mg/dL   6-29 days:   <15.0 mg/dL    ALP 05/17/2025 58  40 - 150 unit/L Final    AST 05/17/2025 20  11 - 45 unit/L Final    ALT 05/17/2025 9 (L)  10 - 44 unit/L Final    Anion Gap 05/17/2025 10  8 - 16 mmol/L Final    eGFR 05/17/2025 43 (L)  >60 mL/min/1.73/m2 Final    Estimated GFR calculated using the CKD-EPI creatinine (2021) equation.    WBC 05/17/2025 5.41  3.90 - 12.70 K/uL Final    RBC 05/17/2025 4.17  4.00 - 5.40 M/uL Final    HGB 05/17/2025 11.4 (L)  12.0 - 16.0 gm/dL Final    HCT 05/17/2025 37.2  37.0 - 48.5 % Final    MCV 05/17/2025 89  82 - 98 fL Final    MCH 05/17/2025 27.3  27.0 - 31.0 pg Final    MCHC 05/17/2025 30.6 (L)  32.0 - 36.0 g/dL Final    RDW 05/17/2025 17.2 (H)  11.5 - 14.5 % Final    Platelet Count 05/17/2025 281  150 - 450 K/uL Final    MPV 05/17/2025  10.6  9.2 - 12.9 fL Final    Nucleated RBC 05/17/2025 0  <=0 /100 WBC Final    Neut % 05/17/2025 51.6  38 - 73 % Final    Lymph % 05/17/2025 33.1  18 - 48 % Final    Mono % 05/17/2025 11.6  4 - 15 % Final    Eos % 05/17/2025 3.1  <=8 % Final    Basophil % 05/17/2025 0.4  <=1.9 % Final    Imm Grans % 05/17/2025 0.2  0.0 - 0.5 % Final    Neut # 05/17/2025 2.79  1.8 - 7.7 K/uL Final    Lymph # 05/17/2025 1.79  1 - 4.8 K/uL Final    Mono # 05/17/2025 0.63  0.3 - 1 K/uL Final    Eos # 05/17/2025 0.17  <=0.5 K/uL Final    Baso # 05/17/2025 0.02  <=0.2 K/uL Final    Imm Grans # 05/17/2025 0.01  0.00 - 0.04 K/uL Final    Mild elevation in immature granulocytes is non specific and can be seen in a variety of conditions including stress response, acute inflammation, trauma and pregnancy. Correlation with other laboratory and clinical findings is essential.        Assessment        ICD-10-CM ICD-9-CM   1. Abnormal ANCA test  R76.8 795.79   2. Anxiety  F41.9 300.00   3. Psychophysiological insomnia  F51.04 307.42         Plan:   Assessment & Plan    IMPRESSION:  Labs revealed elevated autoantibodies, suggesting possible autoimmune condition.  Anti-MPO antibody level at 68 (normal <3.5), indicating significant elevation.  Potential autoimmune etiology for multiple symptoms.  Elevated uric acid noted.  MIGUE pattern (homogeneous) associated with lupus, which could explain kidney, lung, and skin symptoms.    ## ABNORMAL IMMUNOLOGICAL FINDINGS AND CONNECTIVE TISSUE DISORDER:  Debbie has antibodies for ANCA and anti MPO (level at 68, normal <3.5) with a homogeneous MIGUE pattern (previously speckled).  This homogeneous pattern is associated with lupus, which can affect kidney, lung, and skin.  Explained autoantibodies and their implications for autoimmune conditions.  Referred patient to a rheumatologist for evaluation of possible autoimmune condition.  Provided list of Pakistani-speaking rheumatologists: Dr. Jeff Umana, Dr. Hernandez,  Dr. Madrigal, Dr. House, and Dr. Bethea.  Debbie instructed to call provided number to schedule appointment.    ## LOCALIZED CONNECTIVE TISSUE DISORDER:  Debbie presents with facial rash with thickening; skin feels hard and shrunk.    ## GOUT:  Debbie has slightly elevated uric acid but no joint pain reported.  Discussed relationship between elevated uric acid and gout.  No medication indicated at this time unless joints begin to inflame.  Debbie instructed to report any joint swelling or redness, particularly in feet or knees.    ## SLEEP DISORDER:  Debbie reports sleeping but not getting restful sleep.  Increased Mirtazapine dose to 15 mg daily to address both sleep issues and anxiety.    ## ABNORMAL WEIGHT LOSS:  Debbie is experiencing rapid weight loss.    ## PARESTHESIA OF SKIN:  Debbie reports tingling sensation in legs and consistently feeling cold.    ## FOLLOW-UP:  Scheduled follow up in 4-6 weeks to assess response to increased Mirtazapine dose.           1. Abnormal ANCA test  -     Ambulatory referral/consult to Rheumatology; Future; Expected date: 06/19/2025  Will get rheumatology evaluation.    2. Anxiety/ 3. Psychophysiological insomnia  -     mirtazapine (REMERON) 15 MG tablet; Take 1 tablet (15 mg total) by mouth every evening.  Dispense: 90 tablet; Refill: 0  Anxiety and insomnia not controlled.  Will increase Remeron to 15 mg nightly and reevaluate in 4-6 weeks.           -Vasiliy Oneill Jr., MD, AAHIVS      This note was generated with the assistance of ambient listening technology. Verbal consent was obtained by the patient and accompanying visitor(s) for the recording of patient appointment to facilitate this note. I attest to having reviewed and edited the generated note for accuracy, though some syntax or spelling errors may persist. Please contact the author of this note for any clarification.      Patient Instructions   Please call referral team at [phone] to schedule referrals       Follow up  in about 6 weeks (around 7/24/2025) for virtua- insomnia, anxiety.   Future Appointments   Date Time Provider Department Center   6/27/2025  9:00 AM INJECTION, WB INFUSION [facility] CHEMO [facility]   7/25/2025  1:00 PM Vasiliy Oneill Jr., MD [facility]   8/13/2025 10:00 AM Ryan Candelaria MD [facility] NEURO [facility]   9/22/2025  9:00 AM Levy Mueller MD [facility] NEPHRO [facility]   9/26/2025 12:45 PM [facility] US ROOM 4 [facility] ULSOUND [facility]   9/29/2025  8:30 AM LAB, [facility] [place]   9/29/2025  9:30 AM [facility] DEXA1 [facility] BMD [place]   10/6/2025  9:30 AM Romero Decker MD [facility] ENDOCRN [facility]   11/12/2025  8:40 AM Johana Pedersen MD [facility] RHEUM [place]   11/15/2025  8:30 AM LAB, [facility] [place]   11/21/2025 10:40 AM Vasiliy Oneill Jr., MD [facility]   4/24/2026 10:30 AM Addie Bose NP [facility]

## 2025-06-27 ENCOUNTER — INFUSION (OUTPATIENT)
Dept: INFUSION THERAPY | Facility: HOSPITAL | Age: OVER 89
End: 2025-06-27
Attending: FAMILY MEDICINE
Payer: MEDICARE

## 2025-06-27 VITALS
RESPIRATION RATE: 18 BRPM | TEMPERATURE: 98 F | OXYGEN SATURATION: 96 % | SYSTOLIC BLOOD PRESSURE: 114 MMHG | HEART RATE: 76 BPM | DIASTOLIC BLOOD PRESSURE: 66 MMHG

## 2025-06-27 DIAGNOSIS — M85.80 OSTEOPENIA WITH HIGH RISK OF FRACTURE: Primary | ICD-10-CM

## 2025-06-27 DIAGNOSIS — N18.31 STAGE 3A CHRONIC KIDNEY DISEASE: Chronic | ICD-10-CM

## 2025-06-27 DIAGNOSIS — I10 ESSENTIAL HYPERTENSION, BENIGN: Chronic | ICD-10-CM

## 2025-06-27 LAB
ALBUMIN SERPL BCP-MCNC: 3.8 G/DL (ref 3.5–5.2)
ALP SERPL-CCNC: 63 UNIT/L (ref 40–150)
ALT SERPL W/O P-5'-P-CCNC: 7 UNIT/L (ref 10–44)
ANION GAP (OHS): 13 MMOL/L (ref 8–16)
AST SERPL-CCNC: 18 UNIT/L (ref 11–45)
BILIRUB SERPL-MCNC: 0.7 MG/DL (ref 0.1–1)
BUN SERPL-MCNC: 26 MG/DL (ref 8–23)
CALCIUM SERPL-MCNC: 9.8 MG/DL (ref 8.7–10.5)
CHLORIDE SERPL-SCNC: 100 MMOL/L (ref 95–110)
CO2 SERPL-SCNC: 29 MMOL/L (ref 23–29)
CREAT SERPL-MCNC: 1.3 MG/DL (ref 0.5–1.4)
GFR SERPLBLD CREATININE-BSD FMLA CKD-EPI: 39 ML/MIN/1.73/M2
GLUCOSE SERPL-MCNC: 103 MG/DL (ref 70–110)
POTASSIUM SERPL-SCNC: 3.2 MMOL/L (ref 3.5–5.1)
PROT SERPL-MCNC: 7.9 GM/DL (ref 6–8.4)
SODIUM SERPL-SCNC: 142 MMOL/L (ref 136–145)

## 2025-06-27 PROCEDURE — 36415 COLL VENOUS BLD VENIPUNCTURE: CPT | Mod: HCNC

## 2025-06-27 PROCEDURE — 80053 COMPREHEN METABOLIC PANEL: CPT | Mod: HCNC

## 2025-06-27 NOTE — PLAN OF CARE
Patient ambulated onto unit for CMP only (calcium 9.8), VSS, no s/s of distress.. Next appointments set up for Prolia, patient prefers to use [employer] for reminders. Prolia injection planed to be given in July, as the injection is q6months. Patient ambulated off unit, no s/s of distress.

## 2025-07-15 ENCOUNTER — PATIENT MESSAGE (OUTPATIENT)
Dept: FAMILY MEDICINE | Facility: CLINIC | Age: OVER 89
End: 2025-07-15
Payer: MEDICARE

## 2025-07-25 ENCOUNTER — OFFICE VISIT (OUTPATIENT)
Dept: FAMILY MEDICINE | Facility: CLINIC | Age: OVER 89
End: 2025-07-25
Payer: MEDICARE

## 2025-07-25 VITALS — SYSTOLIC BLOOD PRESSURE: 102 MMHG | DIASTOLIC BLOOD PRESSURE: 53 MMHG

## 2025-07-25 DIAGNOSIS — F41.9 ANXIETY: Chronic | ICD-10-CM

## 2025-07-25 DIAGNOSIS — F51.04 PSYCHOPHYSIOLOGICAL INSOMNIA: Chronic | ICD-10-CM

## 2025-07-25 PROCEDURE — 98005 SYNCH AUDIO-VIDEO EST LOW 20: CPT | Mod: HCNC,95,, | Performed by: FAMILY MEDICINE

## 2025-07-25 PROCEDURE — 1160F RVW MEDS BY RX/DR IN RCRD: CPT | Mod: CPTII,HCNC,95, | Performed by: FAMILY MEDICINE

## 2025-07-25 PROCEDURE — 1159F MED LIST DOCD IN RCRD: CPT | Mod: CPTII,HCNC,95, | Performed by: FAMILY MEDICINE

## 2025-07-25 RX ORDER — MIRTAZAPINE 15 MG/1
30 TABLET, FILM COATED ORAL NIGHTLY
Start: 2025-07-25

## 2025-07-25 NOTE — PROGRESS NOTES
The patient location is: [place], [place]  The chief complaint leading to consultation is: Anxiety, Insomnia    Visit type: audiovisual    Face to Face time with patient: 9 minutes  12 minutes of total time spent on the encounter, which includes face to face time and non-face to face time preparing to see the patient (eg, review of tests), Obtaining and/or reviewing separately obtained history, Documenting clinical information in the electronic or other health record, Independently interpreting results (not separately reported) and communicating results to the patient/family/caregiver, or Care coordination (not separately reported).         Each patient to whom he or she provides medical services by telemedicine is:  (1) informed of the relationship between the physician and patient and the respective role of any other health care provider with respect to management of the patient; and (2) notified that he or she may decline to receive medical services by telemedicine and may withdraw from such care at any time.    Notes:       Patient Name: Debbie Earl    : 1935  MRN: [id number]      Subjective:     Patient ID: Debbie is a 89 y.o. female    Chief Complaint:  No chief complaint on file.    History of Present Illness    Debbie presents today for follow up of anxiety and sleep issues.    She takes Mirtazapine nightly for anxiety and sleep management. Her sleep schedule is consistent, going to bed at 9 PM and waking at 7:30 AM. She reports variable response to medication, with some days showing improvement in anxiety symptoms and others showing less effectiveness. She indicates feeling calmer overall with the medication, though the effect is not consistently uniform.     ROS:  General: -fever, -chills, -fatigue, -weight gain, -weight loss  Eyes: -vision changes, -redness, -discharge  ENT: -ear pain, -nasal congestion, -sore throat  Cardiovascular: -chest pain, -palpitations, -lower extremity edema, +orthostatic  symptoms  Respiratory: -cough, +shortness of breath  Gastrointestinal: -abdominal pain, -nausea, -vomiting, -diarrhea, -constipation, -blood in stool  Genitourinary: -dysuria, -hematuria, -frequency  Musculoskeletal: -joint pain, -muscle pain  Skin: -rash, -lesion  Neurological: -headache, -dizziness, -numbness, -tingling  Psychiatric: +anxiety, -depression, -sleep difficulty       Objective:   BP (!) 102/53 (BP Location: Left arm, Patient Position: Sitting)     Physical Exam  Psychiatric:         Behavior: Behavior normal.         Thought Content: Thought content normal.         Judgment: Judgment normal.                 Assessment        ICD-10-CM ICD-9-CM   1. Anxiety  F41.9 300.00   2. Psychophysiological insomnia  F51.04 307.42         Plan:   Assessment & Plan      1. Anxiety  -     mirtazapine (REMERON) 15 MG tablet; Take 2 tablets (30 mg total) by mouth every evening.    2. Psychophysiological insomnia  -     mirtazapine (REMERON) 15 MG tablet; Take 2 tablets (30 mg total) by mouth every evening.     Will increase mirtazapine to 30 milligrams addressing anxiety and insomnia.            -Vasiliy Oneill Jr., MD, AAHIVS      This note was generated with the assistance of ambient listening technology. Verbal consent was obtained by the patient and accompanying visitor(s) for the recording of patient appointment to facilitate this note. I attest to having reviewed and edited the generated note for accuracy, though some syntax or spelling errors may persist. Please contact the author of this note for any clarification.      There are no Patient Instructions on file for this visit.      No follow-ups on file.   Future Appointments   Date Time Provider Department Center   7/28/2025  9:00 AM INJECTION, WB INFUSION [facility]MO [facility]   8/13/2025 10:00 AM Ryan Candelaria MD [facility] NEURO [facility]   9/22/2025  9:00 AM [facility] Mueller [facility]   9/26/2025 12:45 PM [facility] ROOM 4 [facility]  [facility]   9/29/2025  8:30 AM LAB, [facility] [place]   9/29/2025  9:30 AM LAP DEXA1 [facility] BMD [place]   10/6/2025  9:30 AM Romero Decker MD [facility] ENDOCRN [facility] [place] [facility]   11/12/2025  8:40 AM Johana Pedersen MD [facility] RHEUM [place]   11/21/2025 10:40 AM Vasiliy Oneill Jr., MD [facility]   1/23/2026 10:00 AM LAB, [facility] LAB [place]   1/29/2026  9:00 AM INJECTION, [facility] INFUSION [facility] CHEMO [facility]   4/24/2026 10:30 AM Addie Bose, LISA [facility]

## 2025-07-28 ENCOUNTER — INFUSION (OUTPATIENT)
Dept: INFUSION THERAPY | Facility: HOSPITAL | Age: OVER 89
End: 2025-07-28
Attending: FAMILY MEDICINE
Payer: MEDICARE

## 2025-07-28 VITALS
RESPIRATION RATE: 18 BRPM | OXYGEN SATURATION: 96 % | TEMPERATURE: 99 F | DIASTOLIC BLOOD PRESSURE: 53 MMHG | HEART RATE: 85 BPM | SYSTOLIC BLOOD PRESSURE: 106 MMHG

## 2025-07-28 DIAGNOSIS — M85.80 OSTEOPENIA WITH HIGH RISK OF FRACTURE: Primary | ICD-10-CM

## 2025-07-28 PROCEDURE — 96372 THER/PROPH/DIAG INJ SC/IM: CPT | Mod: HCNC

## 2025-07-28 PROCEDURE — 63600175 PHARM REV CODE 636 W HCPCS: Mod: JZ,TB,HCNC | Performed by: FAMILY MEDICINE

## 2025-07-28 RX ADMIN — DENOSUMAB 60 MG: 60 INJECTION SUBCUTANEOUS at 09:07

## 2025-07-28 NOTE — PLAN OF CARE
Pt arrived to the Infusion unit for maintenance injection today (q6m). Pt is awake, alert, and oriented x4. Ambulates independently with steady gait. Pt is Hebrew speaking, requested daughter at chair side as . Pt reports no new allergies, symptoms, or concerns at this time. Denies any recent falls. Hx of Osteoporosis. Labs reviewed on 6/27/25: Calcium 9.8. Pt is compliant with daily Vitamin D and Calcium supplements. No recent dental procedures reported. Pt consents to plan of care for today. Pt administered Prolia 60mg SubQ injection in (R) upper arm. Pt tolerated injection well with no adverse reactions. Pt given calendar with next scheduled appointment and verbalizes no additional needs at this time. Pt discharged with family in no acute distress.       Problem: Fall Injury Risk  Goal: Absence of Fall and Fall-Related Injury  Outcome: Met

## 2025-08-06 ENCOUNTER — PATIENT MESSAGE (OUTPATIENT)
Dept: FAMILY MEDICINE | Facility: CLINIC | Age: OVER 89
End: 2025-08-06
Payer: MEDICARE

## 2025-08-06 DIAGNOSIS — F41.9 ANXIETY: Chronic | ICD-10-CM

## 2025-08-06 DIAGNOSIS — F51.04 PSYCHOPHYSIOLOGICAL INSOMNIA: Chronic | ICD-10-CM

## 2025-08-11 RX ORDER — MIRTAZAPINE 30 MG/1
30 TABLET, FILM COATED ORAL NIGHTLY
Qty: 90 TABLET | Refills: 1 | Status: SHIPPED | OUTPATIENT
Start: 2025-08-11

## 2025-08-13 ENCOUNTER — OFFICE VISIT (OUTPATIENT)
Dept: NEUROLOGY | Facility: CLINIC | Age: OVER 89
End: 2025-08-13
Payer: MEDICARE

## 2025-08-13 VITALS
WEIGHT: 128.5 LBS | HEART RATE: 70 BPM | SYSTOLIC BLOOD PRESSURE: 132 MMHG | HEIGHT: 60 IN | BODY MASS INDEX: 25.23 KG/M2 | OXYGEN SATURATION: 95 % | DIASTOLIC BLOOD PRESSURE: 67 MMHG

## 2025-08-13 DIAGNOSIS — G60.9 IDIOPATHIC PERIPHERAL NEUROPATHY: ICD-10-CM

## 2025-08-13 DIAGNOSIS — M19.90 OSTEOARTHRITIS, UNSPECIFIED OSTEOARTHRITIS TYPE, UNSPECIFIED SITE: Primary | ICD-10-CM

## 2025-08-13 PROCEDURE — 1101F PT FALLS ASSESS-DOCD LE1/YR: CPT | Mod: CPTII,S$GLB,, | Performed by: INTERNAL MEDICINE

## 2025-08-13 PROCEDURE — 99203 OFFICE O/P NEW LOW 30 MIN: CPT | Mod: S$GLB,,, | Performed by: INTERNAL MEDICINE

## 2025-08-13 PROCEDURE — 99999 PR PBB SHADOW E&M-EST. PATIENT-LVL IV: CPT | Mod: PBBFAC,HCNC,, | Performed by: INTERNAL MEDICINE

## 2025-08-13 PROCEDURE — 1159F MED LIST DOCD IN RCRD: CPT | Mod: CPTII,S$GLB,, | Performed by: INTERNAL MEDICINE

## 2025-08-13 PROCEDURE — 3288F FALL RISK ASSESSMENT DOCD: CPT | Mod: CPTII,S$GLB,, | Performed by: INTERNAL MEDICINE

## 2025-08-13 PROCEDURE — 1126F AMNT PAIN NOTED NONE PRSNT: CPT | Mod: CPTII,S$GLB,, | Performed by: INTERNAL MEDICINE

## 2025-08-20 ENCOUNTER — APPOINTMENT (OUTPATIENT)
Dept: RADIOLOGY | Facility: HOSPITAL | Age: OVER 89
End: 2025-08-20
Attending: INTERNAL MEDICINE
Payer: MEDICARE

## 2025-08-20 DIAGNOSIS — M19.90 OSTEOARTHRITIS, UNSPECIFIED OSTEOARTHRITIS TYPE, UNSPECIFIED SITE: ICD-10-CM

## 2025-08-20 PROCEDURE — 73562 X-RAY EXAM OF KNEE 3: CPT | Mod: TC,PN,LT

## 2025-08-20 PROCEDURE — 73562 X-RAY EXAM OF KNEE 3: CPT | Mod: 26,LT,, | Performed by: RADIOLOGY

## 2025-08-29 DIAGNOSIS — I10 ESSENTIAL HYPERTENSION, BENIGN: Chronic | ICD-10-CM

## 2025-08-29 RX ORDER — VALSARTAN AND HYDROCHLOROTHIAZIDE 160; 12.5 MG/1; MG/1
1 TABLET, FILM COATED ORAL
Qty: 90 TABLET | Refills: 3 | Status: SHIPPED | OUTPATIENT
Start: 2025-08-29